# Patient Record
Sex: MALE | Race: WHITE | NOT HISPANIC OR LATINO | Employment: UNEMPLOYED | URBAN - METROPOLITAN AREA
[De-identification: names, ages, dates, MRNs, and addresses within clinical notes are randomized per-mention and may not be internally consistent; named-entity substitution may affect disease eponyms.]

---

## 2017-03-19 ENCOUNTER — HOSPITAL ENCOUNTER (EMERGENCY)
Facility: OTHER | Age: 62
Discharge: HOME | End: 2017-03-19
Attending: EMERGENCY MEDICINE
Payer: COMMERCIAL

## 2017-03-19 VITALS
RESPIRATION RATE: 18 BRPM | TEMPERATURE: 99 F | HEART RATE: 88 BPM | SYSTOLIC BLOOD PRESSURE: 146 MMHG | OXYGEN SATURATION: 95 % | DIASTOLIC BLOOD PRESSURE: 99 MMHG

## 2017-03-19 DIAGNOSIS — T14.90XA INJURY: Primary | ICD-10-CM

## 2017-03-19 DIAGNOSIS — S80.02XA CONTUSION OF KNEE AND LOWER LEG, LEFT, INITIAL ENCOUNTER: ICD-10-CM

## 2017-03-19 DIAGNOSIS — S80.12XA CONTUSION OF KNEE AND LOWER LEG, LEFT, INITIAL ENCOUNTER: ICD-10-CM

## 2017-03-19 DIAGNOSIS — S60.419A ABRASION OF HAND AND FINGERS, RIGHT, INITIAL ENCOUNTER: ICD-10-CM

## 2017-03-19 DIAGNOSIS — S60.511A ABRASION OF HAND AND FINGERS, RIGHT, INITIAL ENCOUNTER: ICD-10-CM

## 2017-03-19 PROCEDURE — 90471 IMMUNIZATION ADMIN: CPT | Performed by: EMERGENCY MEDICINE

## 2017-03-19 PROCEDURE — 90715 TDAP VACCINE 7 YRS/> IM: CPT | Performed by: EMERGENCY MEDICINE

## 2017-03-19 PROCEDURE — 99283 EMERGENCY DEPT VISIT LOW MDM: CPT

## 2017-03-19 PROCEDURE — 63600175 PHARM REV CODE 636 W HCPCS: Performed by: EMERGENCY MEDICINE

## 2017-03-19 RX ORDER — TRAMADOL HYDROCHLORIDE 50 MG/1
50 TABLET ORAL EVERY 6 HOURS PRN
Qty: 12 TABLET | Refills: 0 | Status: SHIPPED | OUTPATIENT
Start: 2017-03-19 | End: 2017-03-29

## 2017-03-19 RX ADMIN — CLOSTRIDIUM TETANI TOXOID ANTIGEN (FORMALDEHYDE INACTIVATED), CORYNEBACTERIUM DIPHTHERIAE TOXOID ANTIGEN (FORMALDEHYDE INACTIVATED), BORDETELLA PERTUSSIS TOXOID ANTIGEN (GLUTARALDEHYDE INACTIVATED), BORDETELLA PERTUSSIS FILAMENTOUS HEMAGGLUTININ ANTIGEN (FORMALDEHYDE INACTIVATED), BORDETELLA PERTUSSIS PERTACTIN ANTIGEN, AND BORDETELLA PERTUSSIS FIMBRIAE 2/3 ANTIGEN 0.5 ML: 5; 2; 2.5; 5; 3; 5 INJECTION, SUSPENSION INTRAMUSCULAR at 09:03

## 2017-03-19 NOTE — DISCHARGE INSTRUCTIONS
Abrasions  Abrasions are skin scrapes. Their treatment depends on how large and deep the abrasion is.  Home care  You may be prescribed an antibiotic cream or ointment to apply to the wound. This helps prevent infection. Follow instructions when using this medication.  General care  · To care for the abrasion, do the following each day for as long as directed by your health care provider.  ¨ If you were given a bandage, change it once a day. If your bandage sticks to the wound, soak it in warm water until it loosens.  ¨ Wash the area with soap and warm water. You may do this in a sink or under a tub faucet or shower. Rinse off the soap. Then pat the area dry with a clean towel.  ¨ If antibiotic ointment or cream was prescribed, reapply it to the wound as directed. Cover the wound with a fresh non-stick bandage. If the bandage becomes wet or dirty, change it as soon as possible.  · You may use acetaminophen or ibuprofen to control pain unless another pain medication was prescribed. Note: If you have chronic liver or kidney disease or ever had a stomach ulcer or GI bleeding, talk with your health care provider before using these medications. Do not use ibuprofen in children under six months of age.  · Most skin wounds heal within ten days. But an infection may occur despite treatment. Therefore, monitor the wound for signs of infection as listed below.  Follow-up care  Follow up with your health care provider, or as advised.  When to seek medical advice  Call your health care provider right away if any of these occur:  · Fever of 101ºF (38.3ºC) or higher, or as directed by your health care provider  · Increasing pain, redness, swelling, or drainage from the wound  · Bleeding from the wound that does not stop after a few minutes of steady, firm pressure  · Decreased ability to move any body part near wound  Date Last Reviewed: 3/22/2015  © 1297-3670 [employer] [facility]. [address] [place] [address], [place]  [address]. All rights reserved. This information is not intended as a substitute for professional medical care. Always follow your healthcare professional's instructions.          Understanding Bone Bruise (Bone Contusion)  A bone bruise is an injury to a bone that is less severe than a bone fracture. Bone bruises are fairly common. They can happen to people of all ages. Any type of bone in your body can get a bone bruise. Other injuries often happen along with a bone bruise, such as damage to nearby ligaments.  What happens when a bone is bruised?  Bone is made of different kinds of tissue. The periosteum is a thin layer of tissue that covers most of a bone. Where bones come together, there is usually a layer of cartilage at the edges. The bone here is called subchondral bone. Deep inside the bone is an area called the medulla. It contains the bone marrow and fibrous tissue called trabeculae.  With a bone fracture, all of the trabeculae in a region of bone have broken. But with a bone bruise, an injury only damages some of these trabeculae. An injury might cause blood to build up in the area beneath the periosteum. This causes a subperiosteal hematoma, a type of bone bruise. An injury might also cause bleeding and swelling in the area between your cartilage and the bone beneath it. This causes a subchondral bone bruise. Or bleeding and swelling can occur in the medulla of your bone. This is called an interosseous bone bruise.  What causes a bone bruise?  Injury of any kind can cause a bone bruise. Sports injuries, motor vehicle accidents, or falls from a height can cause them. Twisting injuries that cause joint sprains can also cause a bone bruise. Health conditions like arthritis may also lead to a bone bruise. This is because arthritis causes bone surfaces to grind against each other. Child abuse is another cause of bone bruises.  Symptoms of a bone bruise  Symptoms of a bone bruise can include:  · Pain and soreness  in the injured area  · Swelling in the area and soft tissues around it  · Change in color of the injured area  · Swelling or stiffness of an injured joint  This pain is often more severe and lasts longer than a soft tissue injury. How severe your symptoms are and how long they last depends on how severe the bone bruise is.  Diagnosing a bone bruise  Your healthcare provider will ask you about your medical history and symptoms. He or she will ask how you got your injury. Your provider will examine the injured area to check for pain, bruising, and swelling. After the exam, your health care provider may be able to tell if you have a bone bruise.  A bone bruise doesnt show up on an X-ray. But you may be given an X-ray to rule out a bone fracture. A fracture may need a different kind of treatment. An MRI can confirm a bone bruise. But your healthcare provider will likely only give you an MRI if your symptoms dont get better.  Date Last Reviewed: 3/3/2015  © 3677-7925 [employer]. [address] [place] [address], [place] [address]. All rights reserved. This information is not intended as a substitute for professional medical care. Always follow your healthcare professional's instructions.
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
## 2017-03-19 NOTE — ED AVS SNAPSHOT
[facility] EMERGENCY DEPARTMENT  [address] [place] [id number]               Adrien Mckeon   3/19/2017  9:08 AM   ED    Description:  Male : 1955   Department:  [facility] Emergency Department           Your Care was Coordinated By:     Provider Role From To    Krystyna Zamora MD Attending Provider 17 0913 --      Reason for Visit     Knee Injury           Diagnoses this Visit        Comments    Injury    -  Primary     Contusion of knee and lower leg, left, initial encounter         Abrasion of hand and fingers, right, initial encounter           ED Disposition     ED Disposition Condition Comment    Discharge             To Do List           Follow-up Information     Follow up with Primary Doctor No.    Why:  As needed, [facility] Referral Line (Tel: [phone])       These Medications        Disp Refills Start End    tramadol (ULTRAM) 50 mg tablet 12 tablet 0 3/19/2017 3/29/2017    Take 1 tablet (50 mg total) by mouth every 6 (six) hours as needed for Pain. - Oral    Pharmacy: [facility] - [facility] - [place] - [address] Ph #: [phone]         [facility]     [facility] -  Assistance  Registered nurses in the [facility] provide clinical advisement, health education, appointment booking, and other advisory services.  Call for this free service at [phone].             Medications           Message regarding Medications     Verify the changes and/or additions to your medication regime listed below are the same as discussed with your clinician today.  If any of these changes or additions are incorrect, please notify your healthcare provider.        START taking these NEW medications        Refills    tramadol (ULTRAM) 50 mg tablet 0    Sig: Take 1 tablet (50 mg total) by mouth every 6 (six) hours as needed for Pain.    Class: Print    Route: Oral      These medications were administered today         Dose Freq    Tdap vaccine injection 0.5 mL 0.5 mL vaccine x 1 dose    Sig: Inject 0.5 mLs into the muscle vaccine x 1 dose for Meets Vaccination Criteria.    Class: Normal    Route: Intramuscular           Verify that the below list of medications is an accurate representation of the medications you are currently taking.  If none reported, the list may be blank. If incorrect, please contact your healthcare provider. Carry this list with you in case of emergency.           Current Medications     tramadol (ULTRAM) 50 mg tablet Take 1 tablet (50 mg total) by mouth every 6 (six) hours as needed for Pain.           Clinical Reference Information           Your Vitals Were     BP Pulse Temp Resp SpO2       146/99 88 98.8 °F (37.1 °C) (Temporal) 18 95%       Allergies as of 3/19/2017     No Known Allergies      Immunizations Administered on Date of Encounter - 3/19/2017     Name Date Dose VIS Date Route    TDAP 3/19/2017 0.5 mL 2/24/2015 Intramuscular      ED Micro, Lab, POCT     None      ED Imaging Orders     Start Ordered       Status Ordering Provider    03/19/17 0920 03/19/17 0919  X-Ray Knee 3 View Left  1 time imaging      Final result         Discharge Instructions         Abrasions  Abrasions are skin scrapes. Their treatment depends on how large and deep the abrasion is.  Home care  You may be prescribed an antibiotic cream or ointment to apply to the wound. This helps prevent infection. Follow instructions when using this medication.  General care  · To care for the abrasion, do the following each day for as long as directed by your health care provider.  ¨ If you were given a bandage, change it once a day. If your bandage sticks to the wound, soak it in warm water until it loosens.  ¨ Wash the area with soap and warm water. You may do this in a sink or under a tub faucet or shower. Rinse off the soap. Then pat the area dry with a clean towel.  ¨ If antibiotic ointment or cream was prescribed, reapply it to the  wound as directed. Cover the wound with a fresh non-stick bandage. If the bandage becomes wet or dirty, change it as soon as possible.  · You may use acetaminophen or ibuprofen to control pain unless another pain medication was prescribed. Note: If you have chronic liver or kidney disease or ever had a stomach ulcer or GI bleeding, talk with your health care provider before using these medications. Do not use ibuprofen in children under six months of age.  · Most skin wounds heal within ten days. But an infection may occur despite treatment. Therefore, monitor the wound for signs of infection as listed below.  Follow-up care  Follow up with your health care provider, or as advised.  When to seek medical advice  Call your health care provider right away if any of these occur:  · Fever of 101ºF (38.3ºC) or higher, or as directed by your health care provider  · Increasing pain, redness, swelling, or drainage from the wound  · Bleeding from the wound that does not stop after a few minutes of steady, firm pressure  · Decreased ability to move any body part near wound  Date Last Reviewed: 3/22/2015  © 1869-3842 [employer]. [address] [place] [address]. All rights reserved. This information is not intended as a substitute for professional medical care. Always follow your healthcare professional's instructions.          Understanding Bone Bruise (Bone Contusion)  A bone bruise is an injury to a bone that is less severe than a bone fracture. Bone bruises are fairly common. They can happen to people of all ages. Any type of bone in your body can get a bone bruise. Other injuries often happen along with a bone bruise, such as damage to nearby ligaments.  What happens when a bone is bruised?  Bone is made of different kinds of tissue. The periosteum is a thin layer of tissue that covers most of a bone. Where bones come together, there is usually a layer of cartilage at the edges. The bone here is  called subchondral bone. Deep inside the bone is an area called the medulla. It contains the bone marrow and fibrous tissue called trabeculae.  With a bone fracture, all of the trabeculae in a region of bone have broken. But with a bone bruise, an injury only damages some of these trabeculae. An injury might cause blood to build up in the area beneath the periosteum. This causes a subperiosteal hematoma, a type of bone bruise. An injury might also cause bleeding and swelling in the area between your cartilage and the bone beneath it. This causes a subchondral bone bruise. Or bleeding and swelling can occur in the medulla of your bone. This is called an interosseous bone bruise.  What causes a bone bruise?  Injury of any kind can cause a bone bruise. Sports injuries, motor vehicle accidents, or falls from a height can cause them. Twisting injuries that cause joint sprains can also cause a bone bruise. Health conditions like arthritis may also lead to a bone bruise. This is because arthritis causes bone surfaces to grind against each other. Child abuse is another cause of bone bruises.  Symptoms of a bone bruise  Symptoms of a bone bruise can include:  · Pain and soreness in the injured area  · Swelling in the area and soft tissues around it  · Change in color of the injured area  · Swelling or stiffness of an injured joint  This pain is often more severe and lasts longer than a soft tissue injury. How severe your symptoms are and how long they last depends on how severe the bone bruise is.  Diagnosing a bone bruise  Your healthcare provider will ask you about your medical history and symptoms. He or she will ask how you got your injury. Your provider will examine the injured area to check for pain, bruising, and swelling. After the exam, your health care provider may be able to tell if you have a bone bruise.  A bone bruise doesnt show up on an X-ray. But you may be given an X-ray to rule out a bone fracture. A  fracture may need a different kind of treatment. An MRI can confirm a bone bruise. But your healthcare provider will likely only give you an MRI if your symptoms dont get better.  Date Last Reviewed: 3/3/2015  © 3114-6977 [employer]. [address] [place] [address], [place] [address]. All rights reserved. This information is not intended as a substitute for professional medical care. Always follow your healthcare professional's instructions.          [facility] Sign-Up     Activating your [facility] account is as easy as 1-2-3!     1) Visit [email or web address], select Sign Up Now, enter this activation code and your date of birth, then select Next.  [email or web address]  Expires: 5/3/2017  9:31 AM      2) Create a username and password to use when you visit [facility] in the future and select a security question in case you lose your password and select Next.    3) Enter your e-mail address and click Sign Up!    Additional Information  If you have questions, please e-mail [email or web address] or call [phone] to talk to our [facility] staff. Remember, [facility] is NOT to be used for urgent needs. For medical emergencies, dial 911.         Smoking Cessation     If you would like to quit smoking:   You may be eligible for free services if you are a [place] resident and started smoking cigarettes before September 1, 1988.  Call the Smoking Cessation Trust (SCT) toll free at (376) 550-9335 or (060) 562-9929.   Call 2-065-QUIT-NOW if you do not meet the above criteria.             [facility] Emergency Department complies with applicable Federal civil rights laws and does not discriminate on the basis of race, color, national origin, age, disability, or sex.        Language Assistance Services     ATTENTION: Language assistance services are available, free of charge. Please call [phone].      ATENCIÓN: Si habla español, tiene a rubio disposición servicios gratuitos de asistencia lingüística. Llame al  [phone].     TAL Ý: N?u b?n nói Ti?ng Vi?t, có các d?ch v? h? tr? ngôn ng? mi?n phí dành cho b?n. G?i s? [phone].

## 2018-07-09 ENCOUNTER — HOSPITAL ENCOUNTER (EMERGENCY)
Facility: HOSPITAL | Age: 63
Discharge: HOME | End: 2018-07-09
Attending: EMERGENCY MEDICINE
Payer: COMMERCIAL

## 2018-07-09 VITALS
HEIGHT: 67 IN | BODY MASS INDEX: 30.61 KG/M2 | DIASTOLIC BLOOD PRESSURE: 98 MMHG | SYSTOLIC BLOOD PRESSURE: 167 MMHG | RESPIRATION RATE: 22 BRPM | TEMPERATURE: 99 F | WEIGHT: 195 LBS | OXYGEN SATURATION: 97 % | HEART RATE: 69 BPM

## 2018-07-09 DIAGNOSIS — S22.32XA CLOSED FRACTURE OF ONE RIB OF LEFT SIDE, INITIAL ENCOUNTER: ICD-10-CM

## 2018-07-09 DIAGNOSIS — R05.9 COUGH: ICD-10-CM

## 2018-07-09 DIAGNOSIS — J43.9 PULMONARY EMPHYSEMA, UNSPECIFIED EMPHYSEMA TYPE: ICD-10-CM

## 2018-07-09 DIAGNOSIS — R07.89 LEFT-SIDED CHEST WALL PAIN: Primary | ICD-10-CM

## 2018-07-09 LAB
ALBUMIN SERPL BCP-MCNC: 3.6 G/DL
ALP SERPL-CCNC: 109 U/L
ALT SERPL W/O P-5'-P-CCNC: 85 U/L
ANION GAP SERPL CALC-SCNC: 9 MMOL/L
AST SERPL-CCNC: 50 U/L
BASOPHILS # BLD AUTO: 0.01 K/UL
BASOPHILS NFR BLD: 0.2 %
BILIRUB SERPL-MCNC: 0.3 MG/DL
BILIRUB UR QL STRIP: NEGATIVE
BUN SERPL-MCNC: 10 MG/DL
CALCIUM SERPL-MCNC: 9.1 MG/DL
CHLORIDE SERPL-SCNC: 104 MMOL/L
CLARITY UR: CLEAR
CO2 SERPL-SCNC: 25 MMOL/L
COLOR UR: YELLOW
CREAT SERPL-MCNC: 0.8 MG/DL
DIFFERENTIAL METHOD: ABNORMAL
EOSINOPHIL # BLD AUTO: 0.2 K/UL
EOSINOPHIL NFR BLD: 2.5 %
ERYTHROCYTE [DISTWIDTH] IN BLOOD BY AUTOMATED COUNT: 12 %
EST. GFR  (AFRICAN AMERICAN): >60 ML/MIN/1.73 M^2
EST. GFR  (NON AFRICAN AMERICAN): >60 ML/MIN/1.73 M^2
GLUCOSE SERPL-MCNC: 128 MG/DL
GLUCOSE UR QL STRIP: NEGATIVE
HCT VFR BLD AUTO: 42.2 %
HGB BLD-MCNC: 14.7 G/DL
HGB UR QL STRIP: NEGATIVE
KETONES UR QL STRIP: NEGATIVE
LEUKOCYTE ESTERASE UR QL STRIP: NEGATIVE
LIPASE SERPL-CCNC: 94 U/L
LYMPHOCYTES # BLD AUTO: 2.4 K/UL
LYMPHOCYTES NFR BLD: 36.9 %
MCH RBC QN AUTO: 31.5 PG
MCHC RBC AUTO-ENTMCNC: 34.8 G/DL
MCV RBC AUTO: 91 FL
MONOCYTES # BLD AUTO: 0.8 K/UL
MONOCYTES NFR BLD: 12.8 %
NEUTROPHILS # BLD AUTO: 3.1 K/UL
NEUTROPHILS NFR BLD: 47.4 %
NITRITE UR QL STRIP: NEGATIVE
PH UR STRIP: 5 [PH] (ref 5–8)
PLATELET # BLD AUTO: 211 K/UL
PMV BLD AUTO: 11.6 FL
POTASSIUM SERPL-SCNC: 4.1 MMOL/L
PROT SERPL-MCNC: 7.1 G/DL
PROT UR QL STRIP: NEGATIVE
RBC # BLD AUTO: 4.66 M/UL
SODIUM SERPL-SCNC: 138 MMOL/L
SP GR UR STRIP: 1.01 (ref 1–1.03)
URN SPEC COLLECT METH UR: NORMAL
UROBILINOGEN UR STRIP-ACNC: NEGATIVE EU/DL
WBC # BLD AUTO: 6.48 K/UL

## 2018-07-09 PROCEDURE — 63600175 PHARM REV CODE 636 W HCPCS: Performed by: EMERGENCY MEDICINE

## 2018-07-09 PROCEDURE — 80053 COMPREHEN METABOLIC PANEL: CPT

## 2018-07-09 PROCEDURE — 85025 COMPLETE CBC W/AUTO DIFF WBC: CPT

## 2018-07-09 PROCEDURE — 81003 URINALYSIS AUTO W/O SCOPE: CPT

## 2018-07-09 PROCEDURE — 25000242 PHARM REV CODE 250 ALT 637 W/ HCPCS: Performed by: EMERGENCY MEDICINE

## 2018-07-09 PROCEDURE — 94761 N-INVAS EAR/PLS OXIMETRY MLT: CPT

## 2018-07-09 PROCEDURE — 25000003 PHARM REV CODE 250: Performed by: EMERGENCY MEDICINE

## 2018-07-09 PROCEDURE — 83690 ASSAY OF LIPASE: CPT

## 2018-07-09 PROCEDURE — 94640 AIRWAY INHALATION TREATMENT: CPT

## 2018-07-09 PROCEDURE — 96372 THER/PROPH/DIAG INJ SC/IM: CPT

## 2018-07-09 PROCEDURE — 99285 EMERGENCY DEPT VISIT HI MDM: CPT | Mod: 25

## 2018-07-09 RX ORDER — IPRATROPIUM BROMIDE AND ALBUTEROL SULFATE 2.5; .5 MG/3ML; MG/3ML
3 SOLUTION RESPIRATORY (INHALATION)
Status: COMPLETED | OUTPATIENT
Start: 2018-07-09 | End: 2018-07-09

## 2018-07-09 RX ORDER — ALBUTEROL SULFATE 2.5 MG/.5ML
5 SOLUTION RESPIRATORY (INHALATION)
Status: COMPLETED | OUTPATIENT
Start: 2018-07-09 | End: 2018-07-09

## 2018-07-09 RX ORDER — BETAMETHASONE SODIUM PHOSPHATE AND BETAMETHASONE ACETATE 3; 3 MG/ML; MG/ML
12 INJECTION, SUSPENSION INTRA-ARTICULAR; INTRALESIONAL; INTRAMUSCULAR; SOFT TISSUE ONCE
Status: COMPLETED | OUTPATIENT
Start: 2018-07-09 | End: 2018-07-09

## 2018-07-09 RX ORDER — PREDNISONE 20 MG/1
60 TABLET ORAL DAILY
Qty: 15 TABLET | Refills: 0 | Status: SHIPPED | OUTPATIENT
Start: 2018-07-09 | End: 2018-07-14

## 2018-07-09 RX ORDER — ASPIRIN 81 MG/1
81 TABLET ORAL DAILY
COMMUNITY
End: 2023-12-08

## 2018-07-09 RX ORDER — ALFUZOSIN HYDROCHLORIDE 10 MG/1
10 TABLET, EXTENDED RELEASE ORAL
Status: ON HOLD | COMMUNITY
End: 2022-12-01 | Stop reason: HOSPADM

## 2018-07-09 RX ORDER — ESCITALOPRAM OXALATE 10 MG/1
10 TABLET ORAL DAILY
COMMUNITY
End: 2019-09-24

## 2018-07-09 RX ORDER — KETOROLAC TROMETHAMINE 30 MG/ML
10 INJECTION, SOLUTION INTRAMUSCULAR; INTRAVENOUS
Status: DISCONTINUED | OUTPATIENT
Start: 2018-07-09 | End: 2018-07-09

## 2018-07-09 RX ORDER — HYDROCODONE BITARTRATE AND ACETAMINOPHEN 5; 325 MG/1; MG/1
1 TABLET ORAL EVERY 4 HOURS PRN
Qty: 15 TABLET | Refills: 0 | Status: SHIPPED | OUTPATIENT
Start: 2018-07-09 | End: 2018-07-19

## 2018-07-09 RX ORDER — ALBUTEROL SULFATE 90 UG/1
1-2 AEROSOL, METERED RESPIRATORY (INHALATION) EVERY 6 HOURS PRN
Qty: 1 INHALER | Refills: 1 | Status: SHIPPED | OUTPATIENT
Start: 2018-07-09 | End: 2018-08-08

## 2018-07-09 RX ORDER — LEVOTHYROXINE SODIUM 50 UG/1
50 TABLET ORAL DAILY
Status: ON HOLD | COMMUNITY
End: 2022-12-01 | Stop reason: HOSPADM

## 2018-07-09 RX ORDER — CLONAZEPAM 0.5 MG/1
0.5 TABLET ORAL 2 TIMES DAILY PRN
COMMUNITY
End: 2019-09-24

## 2018-07-09 RX ADMIN — ALBUTEROL SULFATE 5 MG: 2.5 SOLUTION RESPIRATORY (INHALATION) at 06:07

## 2018-07-09 RX ADMIN — BETAMETHASONE SODIUM PHOSPHATE AND BETAMETHASONE ACETATE 12 MG: 3; 3 INJECTION, SUSPENSION INTRA-ARTICULAR; INTRALESIONAL; INTRAMUSCULAR at 06:07

## 2018-07-09 RX ADMIN — GUAIFENESIN AND DEXTROMETHORPHAN HYDROBROMIDE 1 TABLET: 600; 30 TABLET, EXTENDED RELEASE ORAL at 05:07

## 2018-07-09 RX ADMIN — IPRATROPIUM BROMIDE AND ALBUTEROL SULFATE 3 ML: .5; 2.5 SOLUTION RESPIRATORY (INHALATION) at 06:07

## 2018-07-09 NOTE — DISCHARGE INSTRUCTIONS
Please return immediately if you get worse or if new problems develop.  Please make an appointment to see her primary care doctor this week.  Hydrocodone for pain.  Prednisone as directed.  Rest

## 2018-07-09 NOTE — ED TRIAGE NOTES
"Pt presents to the ED vis EMS with complaints of LUQ pain and SOB. Pt states "he was coughing hard he just started hurting really bad".  "

## 2018-07-09 NOTE — ED PROVIDER NOTES
"    Encounter Date: 7/9/2018    SCRIBE #1 NOTE: I, MaryXanderArpita Jason, am scribing for, and in the presence of,  Keith Terrell MD. I have scribed the following portions of the note - Other sections scribed: HPI, ROS.       History     Chief Complaint   Patient presents with    Muscle Pain     left upper abdominal/flank pain, states he has had cough for last few days, approx 30 mins ago states he feels like he pulled a muscle. Hurts worse with coughing or movement.    Shortness of Breath     "I do feel like I am short winded." Pt using abdominal muscles with breathing.     CC: Cough    63 y/o male with emphysema presents to the ED via EMS for emergent evaluation of chronic cough with associated pain to L lateral thorax that started at 4AM today. The patient reports a "sharp" pain that is severe (8/10) and worse with deep inhalation and coughing. The patient reports chronic cough and stable chronic SOB due to hx of emphysema. He states his current cough is worse than his usual chronic cough.  He reports that he smoked mojo yesterday. The patient smokes cigarettes and drinks EtOH occasionally. The patient denies abdominal pain, dysuria, N/V/D, rash, or arm/leg trouble. No other symptoms reported.      The history is provided by the patient. No  was used.     Review of patient's allergies indicates:  No Known Allergies  Past Medical History:   Diagnosis Date    Arthritis     Prostate disorder     Thyroid disease      Past Surgical History:   Procedure Laterality Date    HAND SURGERY      left hand     Family History   Problem Relation Age of Onset    Cancer Mother     Cancer Father     Heart disease Father     Cancer Sister      Social History   Substance Use Topics    Smoking status: Current Every Day Smoker     Packs/day: 1.00     Types: Cigarettes    Smokeless tobacco: Never Used    Alcohol use 5.0 oz/week     10 Standard drinks or equivalent per week     Review of Systems "   Constitutional: Negative for chills and fever.   HENT: Negative for congestion, ear pain, rhinorrhea and sore throat.    Eyes: Negative for redness.   Respiratory: Positive for cough (chronic with associated pain to L lateral thorax) and shortness of breath (stable chronic).    Cardiovascular: Negative for chest pain.   Gastrointestinal: Negative for abdominal pain, diarrhea, nausea and vomiting.   Genitourinary: Negative for decreased urine volume, difficulty urinating, dysuria, frequency, hematuria and urgency.   Musculoskeletal: Negative for back pain and neck pain.        (-) arm/leg trouble   Skin: Negative for rash.   Neurological: Negative for headaches.       Physical Exam     Initial Vitals [07/09/18 0458]   BP Pulse Resp Temp SpO2   (!) 160/94 92 18 98.2 °F (36.8 °C) 98 %      MAP       --         Physical Exam  The patient was examined specifically for the following:   General:No significant distress, Good color, Warm and dry. Head and neck:Scalp atraumatic, Neck supple. Neurological:Appropriate conversation, Gross motor deficits. Eyes:Conjugate gaze, Clear corneas. ENT: No epistaxis. Cardiac: Regular rate and rhythm, Grossly normal heart tones. Pulmonary: Wheezing, Rales. Gastrointestinal: Abdominal tenderness, Abdominal distention. Musculoskeletal: Extremity deformity, Apparent pain with range of motion of the joints. Skin: Rash.   The findings on examination were normal except for the following:  The patient has wheezing bilaterally.  There are sharp tenderness in the left lateral thorax, at about the 5th rib.  Patient has a hoarse voice.  The posterior pharynx is essentially unremarkable.  There is no stridor.  There is no evidence of airway obstruction.  ED Course   Procedures  Labs Reviewed   CBC W/ AUTO DIFFERENTIAL - Abnormal; Notable for the following:        Result Value    MCH 31.5 (*)     All other components within normal limits   COMPREHENSIVE METABOLIC PANEL - Abnormal; Notable for the  following:     Glucose 128 (*)     AST 50 (*)     ALT 85 (*)     All other components within normal limits   LIPASE - Abnormal; Notable for the following:     Lipase 94 (*)     All other components within normal limits   URINALYSIS          Imaging Results          X-Ray Chest PA And Lateral (Final result)  Result time 07/09/18 05:31:09    Final result by Chace Jim MD (07/09/18 05:31:09)                 Impression:      1.  Mild coarse interstitial lung markings bilaterally without evidence of large confluent airspace consolidation.    2.  Possible minimally displaced fracture of the lateral left 6th rib.  Correlation with point tenderness is advised.      Electronically signed by: Chace Jim MD  Date:    07/09/2018  Time:    05:31             Narrative:    EXAMINATION:  XR CHEST PA AND LATERAL    CLINICAL HISTORY:  Cough    TECHNIQUE:  PA and lateral views of the chest were performed.    COMPARISON:  07/04/2015    FINDINGS:  The cardiomediastinal silhouette appears within normal limits.  There is atherosclerotic calcification of the thoracic aorta.  The lungs appear symmetrically expanded with mild coarse interstitial markings bilaterally.  There is subsegmental atelectasis at the lung bases.  No large confluent airspace consolidation identified.  No definite evidence of significant pneumothorax.  There is no pleural effusion.    There is an apparent minimally displaced fracture of the left 6th lateral rib.  No definite additional displaced rib fractures appreciated.  The remaining osseous structures demonstrate stable degenerative changes.                              Medical decision making:  Given the above, this patient was smoking Mojo.  He coughed and had developed sharp pain in his left lateral thorax.  He has point tenderness there.  There is no evidence of pneumothorax pneumonia pleural effusion.  The patient does have interstitial changes consistent with chronic lung disease.  The patient is  a cigarette smoker.  He has bilateral wheezing.  He has some shortness of breath but it is chronic and stable. The left lateral thoracic pain is well localized, sharp and worse with deep breathing.  X-rays reveal possible fractured rib is conceivable this patient does have a fracture there.  I doubt pulmonary embolus.  I do not think this is a presentation for cardiac pain. The patient has a hoarse voice.  The posterior pharynx is slightly red but otherwise unremarkable.  I doubt airway obstruction foreign body.  The patient probably irritated his throat smoking Mojo.  I will treat him with steroids and bronchodilators and have him follow up with primary care.                Scribe Attestation:   Scribe #1: I performed the above scribed service and the documentation accurately describes the services I performed. I attest to the accuracy of the note.    Attending Attestation:           Physician Attestation for Scribe:  Physician Attestation Statement for Scribe #1: I, Keith Terrell MD, reviewed documentation, as scribed by Adrian Jason in my presence, and it is both accurate and complete.                    Clinical Impression:   The primary encounter diagnosis was Left-sided chest wall pain. Diagnoses of Cough, Closed fracture of one rib of left side, initial encounter, and Pulmonary emphysema, unspecified emphysema type were also pertinent to this visit.                             Keith Chamberlain MD  07/09/18 9098

## 2018-10-28 ENCOUNTER — HOSPITAL ENCOUNTER (EMERGENCY)
Facility: HOSPITAL | Age: 63
Discharge: HOME | End: 2018-10-28
Attending: EMERGENCY MEDICINE
Payer: COMMERCIAL

## 2018-10-28 VITALS
TEMPERATURE: 98 F | OXYGEN SATURATION: 98 % | RESPIRATION RATE: 18 BRPM | WEIGHT: 192 LBS | SYSTOLIC BLOOD PRESSURE: 175 MMHG | BODY MASS INDEX: 30.13 KG/M2 | HEART RATE: 83 BPM | HEIGHT: 67 IN | DIASTOLIC BLOOD PRESSURE: 91 MMHG
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
## 2018-10-28 DIAGNOSIS — J90 PLEURAL EFFUSION: ICD-10-CM

## 2018-10-28 DIAGNOSIS — S22.32XA CLOSED FRACTURE OF ONE RIB OF LEFT SIDE, INITIAL ENCOUNTER: Primary | ICD-10-CM

## 2018-10-28 DIAGNOSIS — R07.81 RIB PAIN ON LEFT SIDE: ICD-10-CM

## 2018-10-28 PROCEDURE — 99284 EMERGENCY DEPT VISIT MOD MDM: CPT | Mod: 25

## 2018-10-28 PROCEDURE — 99900035 HC TECH TIME PER 15 MIN (STAT)

## 2018-10-28 PROCEDURE — 63600175 PHARM REV CODE 636 W HCPCS: Performed by: PHYSICIAN ASSISTANT

## 2018-10-28 PROCEDURE — 96374 THER/PROPH/DIAG INJ IV PUSH: CPT

## 2018-10-28 PROCEDURE — 94799 UNLISTED PULMONARY SVC/PX: CPT

## 2018-10-28 PROCEDURE — 96375 TX/PRO/DX INJ NEW DRUG ADDON: CPT

## 2018-10-28 RX ORDER — OXYCODONE AND ACETAMINOPHEN 5; 325 MG/1; MG/1
1 TABLET ORAL EVERY 4 HOURS PRN
Qty: 10 TABLET | Refills: 0 | OUTPATIENT
Start: 2018-10-28 | End: 2018-11-29

## 2018-10-28 RX ORDER — ACETAMINOPHEN 10 MG/ML
1000 INJECTION, SOLUTION INTRAVENOUS ONCE
Status: COMPLETED | OUTPATIENT
Start: 2018-10-28 | End: 2018-10-28

## 2018-10-28 RX ORDER — LIDOCAINE 50 MG/G
1 PATCH TOPICAL DAILY
Qty: 15 PATCH | Refills: 0 | Status: ON HOLD | OUTPATIENT
Start: 2018-10-28 | End: 2022-12-01 | Stop reason: HOSPADM

## 2018-10-28 RX ORDER — KETOROLAC TROMETHAMINE 10 MG/1
10 TABLET, FILM COATED ORAL 3 TIMES DAILY PRN
Qty: 15 TABLET | Refills: 0 | Status: SHIPPED | OUTPATIENT
Start: 2018-10-28 | End: 2018-11-02

## 2018-10-28 RX ORDER — MORPHINE SULFATE 4 MG/ML
4 INJECTION, SOLUTION INTRAMUSCULAR; INTRAVENOUS
Status: COMPLETED | OUTPATIENT
Start: 2018-10-28 | End: 2018-10-28

## 2018-10-28 RX ORDER — ALBUTEROL SULFATE 2.5 MG/.5ML
2.5 SOLUTION RESPIRATORY (INHALATION) EVERY 4 HOURS PRN
Qty: 15 EACH | Refills: 0 | Status: SHIPPED | OUTPATIENT
Start: 2018-10-28 | End: 2019-10-28

## 2018-10-28 RX ADMIN — MORPHINE SULFATE 4 MG: 4 INJECTION INTRAVENOUS at 01:10

## 2018-10-28 RX ADMIN — ACETAMINOPHEN 1000 MG: 10 INJECTION, SOLUTION INTRAVENOUS at 12:10

## 2018-10-28 NOTE — DISCHARGE INSTRUCTIONS
Toradol for pain. Percocet for severe pain. Lidoderm patch daily. Incentive spirometry 10x daily. Follow up with primary care physician this week.  Return to this ED if you begin with shortness of breath, if you begin worsening pain, if any other problems occur.
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
## 2018-11-29 ENCOUNTER — HOSPITAL ENCOUNTER (EMERGENCY)
Facility: HOSPITAL | Age: 63
Discharge: HOME | End: 2018-11-29
Attending: EMERGENCY MEDICINE
Payer: COMMERCIAL

## 2018-11-29 VITALS
TEMPERATURE: 98 F | BODY MASS INDEX: 30.86 KG/M2 | HEIGHT: 66 IN | WEIGHT: 192 LBS | RESPIRATION RATE: 20 BRPM | OXYGEN SATURATION: 99 % | HEART RATE: 79 BPM | SYSTOLIC BLOOD PRESSURE: 149 MMHG | DIASTOLIC BLOOD PRESSURE: 92 MMHG

## 2018-11-29 DIAGNOSIS — S22.42XA CLOSED FRACTURE OF MULTIPLE RIBS OF LEFT SIDE, INITIAL ENCOUNTER: Primary | ICD-10-CM

## 2018-11-29 DIAGNOSIS — M54.6 ACUTE LEFT-SIDED THORACIC BACK PAIN: ICD-10-CM

## 2018-11-29 PROCEDURE — 99283 EMERGENCY DEPT VISIT LOW MDM: CPT | Mod: 25

## 2018-11-29 RX ORDER — AMLODIPINE BESYLATE 10 MG/1
10 TABLET ORAL DAILY
COMMUNITY

## 2018-11-29 RX ORDER — TIOTROPIUM BROMIDE 18 UG/1
18 CAPSULE ORAL; RESPIRATORY (INHALATION) DAILY
COMMUNITY
End: 2023-12-08

## 2018-11-29 RX ORDER — HYDROCODONE BITARTRATE AND ACETAMINOPHEN 5; 325 MG/1; MG/1
1 TABLET ORAL EVERY 4 HOURS PRN
Qty: 15 TABLET | Refills: 0 | Status: SHIPPED | OUTPATIENT
Start: 2018-11-29 | End: 2018-12-09

## 2018-11-29 NOTE — DISCHARGE INSTRUCTIONS
Please follow-up with her primary care doctor this week.  Please continue usual medicines, including the prednisone.  Hydrocodone for pain.  Use a heating pad.  Rest.  Lots of deep breathing.  Rest.

## 2018-11-29 NOTE — ED TRIAGE NOTES
"Arrived t ED after having left sided flank pain that started 2 months ago, and coughing and feeling "Pop." Reports having a fall the same night prior to feeling pop. Denies CP,  Reports SOB with deep inhalation  "
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
## 2019-04-20 ENCOUNTER — HOSPITAL ENCOUNTER (EMERGENCY)
Facility: HOSPITAL | Age: 64
Discharge: HOME | End: 2019-04-20
Attending: EMERGENCY MEDICINE
Payer: COMMERCIAL

## 2019-04-20 VITALS
TEMPERATURE: 98 F | HEIGHT: 66 IN | WEIGHT: 192 LBS | OXYGEN SATURATION: 93 % | BODY MASS INDEX: 30.86 KG/M2 | DIASTOLIC BLOOD PRESSURE: 68 MMHG | SYSTOLIC BLOOD PRESSURE: 135 MMHG | HEART RATE: 71 BPM | RESPIRATION RATE: 17 BRPM

## 2019-04-20 DIAGNOSIS — J44.9 CHRONIC OBSTRUCTIVE PULMONARY DISEASE, UNSPECIFIED COPD TYPE: Primary | ICD-10-CM

## 2019-04-20 DIAGNOSIS — R06.02 SOB (SHORTNESS OF BREATH): ICD-10-CM

## 2019-04-20 LAB
ALBUMIN SERPL BCP-MCNC: 3.2 G/DL (ref 3.5–5.2)
ALP SERPL-CCNC: 84 U/L (ref 55–135)
ALT SERPL W/O P-5'-P-CCNC: 13 U/L (ref 10–44)
ANION GAP SERPL CALC-SCNC: 11 MMOL/L (ref 8–16)
AST SERPL-CCNC: 20 U/L (ref 10–40)
BASOPHILS # BLD AUTO: 0 K/UL (ref 0–0.2)
BASOPHILS NFR BLD: 0 % (ref 0–1.9)
BILIRUB SERPL-MCNC: 0.2 MG/DL (ref 0.1–1)
BNP SERPL-MCNC: 39 PG/ML (ref 0–99)
BUN SERPL-MCNC: 5 MG/DL (ref 8–23)
CALCIUM SERPL-MCNC: 8.9 MG/DL (ref 8.7–10.5)
CHLORIDE SERPL-SCNC: 105 MMOL/L (ref 95–110)
CO2 SERPL-SCNC: 26 MMOL/L (ref 23–29)
CREAT SERPL-MCNC: 0.7 MG/DL (ref 0.5–1.4)
DIFFERENTIAL METHOD: ABNORMAL
EOSINOPHIL # BLD AUTO: 0.1 K/UL (ref 0–0.5)
EOSINOPHIL NFR BLD: 2.5 % (ref 0–8)
ERYTHROCYTE [DISTWIDTH] IN BLOOD BY AUTOMATED COUNT: 12.5 % (ref 11.5–14.5)
EST. GFR  (AFRICAN AMERICAN): >60 ML/MIN/1.73 M^2
EST. GFR  (NON AFRICAN AMERICAN): >60 ML/MIN/1.73 M^2
GLUCOSE SERPL-MCNC: 105 MG/DL (ref 70–110)
HCT VFR BLD AUTO: 42.8 % (ref 40–54)
HGB BLD-MCNC: 14.3 G/DL (ref 14–18)
LYMPHOCYTES # BLD AUTO: 2.8 K/UL (ref 1–4.8)
LYMPHOCYTES NFR BLD: 54.1 % (ref 18–48)
MAGNESIUM SERPL-MCNC: 1.9 MG/DL (ref 1.6–2.6)
MCH RBC QN AUTO: 32.1 PG (ref 27–31)
MCHC RBC AUTO-ENTMCNC: 33.4 G/DL (ref 32–36)
MCV RBC AUTO: 96 FL (ref 82–98)
MONOCYTES # BLD AUTO: 0.6 K/UL (ref 0.3–1)
MONOCYTES NFR BLD: 11.7 % (ref 4–15)
NEUTROPHILS # BLD AUTO: 1.6 K/UL (ref 1.8–7.7)
NEUTROPHILS NFR BLD: 31.7 % (ref 38–73)
PLATELET # BLD AUTO: 182 K/UL (ref 150–350)
PMV BLD AUTO: 11 FL (ref 9.2–12.9)
POTASSIUM SERPL-SCNC: 3.6 MMOL/L (ref 3.5–5.1)
PROT SERPL-MCNC: 6.3 G/DL (ref 6–8.4)
RBC # BLD AUTO: 4.46 M/UL (ref 4.6–6.2)
SODIUM SERPL-SCNC: 142 MMOL/L (ref 136–145)
TROPONIN I SERPL DL<=0.01 NG/ML-MCNC: <0.006 NG/ML (ref 0–0.03)
WBC # BLD AUTO: 5.12 K/UL (ref 3.9–12.7)

## 2019-04-20 PROCEDURE — 93005 ELECTROCARDIOGRAM TRACING: CPT

## 2019-04-20 PROCEDURE — 83880 ASSAY OF NATRIURETIC PEPTIDE: CPT

## 2019-04-20 PROCEDURE — 84484 ASSAY OF TROPONIN QUANT: CPT

## 2019-04-20 PROCEDURE — 25000242 PHARM REV CODE 250 ALT 637 W/ HCPCS: Performed by: EMERGENCY MEDICINE

## 2019-04-20 PROCEDURE — 93010 ELECTROCARDIOGRAM REPORT: CPT | Mod: ,,, | Performed by: INTERNAL MEDICINE

## 2019-04-20 PROCEDURE — 99285 EMERGENCY DEPT VISIT HI MDM: CPT

## 2019-04-20 PROCEDURE — 94644 CONT INHLJ TX 1ST HOUR: CPT

## 2019-04-20 PROCEDURE — 85025 COMPLETE CBC W/AUTO DIFF WBC: CPT

## 2019-04-20 PROCEDURE — 83735 ASSAY OF MAGNESIUM: CPT

## 2019-04-20 PROCEDURE — 93010 EKG 12-LEAD: ICD-10-PCS | Mod: ,,, | Performed by: INTERNAL MEDICINE

## 2019-04-20 PROCEDURE — 80053 COMPREHEN METABOLIC PANEL: CPT

## 2019-04-20 RX ORDER — IPRATROPIUM BROMIDE 0.5 MG/2.5ML
1 SOLUTION RESPIRATORY (INHALATION)
Status: COMPLETED | OUTPATIENT
Start: 2019-04-20 | End: 2019-04-20

## 2019-04-20 RX ORDER — ALBUTEROL SULFATE 90 UG/1
1-2 AEROSOL, METERED RESPIRATORY (INHALATION) EVERY 6 HOURS PRN
Qty: 1 INHALER | Refills: 0 | Status: SHIPPED | OUTPATIENT
Start: 2019-04-20 | End: 2023-12-08

## 2019-04-20 RX ORDER — PREDNISONE 20 MG/1
60 TABLET ORAL
Status: DISCONTINUED | OUTPATIENT
Start: 2019-04-20 | End: 2019-04-20

## 2019-04-20 RX ORDER — AZITHROMYCIN 250 MG/1
250 TABLET, FILM COATED ORAL DAILY
Qty: 6 TABLET | Refills: 0 | Status: SHIPPED | OUTPATIENT
Start: 2019-04-20 | End: 2019-09-24

## 2019-04-20 RX ORDER — PREDNISONE 50 MG/1
50 TABLET ORAL DAILY
Qty: 3 TABLET | Refills: 0 | Status: SHIPPED | OUTPATIENT
Start: 2019-04-20 | End: 2019-04-23

## 2019-04-20 RX ORDER — ALBUTEROL SULFATE 2.5 MG/.5ML
10 SOLUTION RESPIRATORY (INHALATION)
Status: COMPLETED | OUTPATIENT
Start: 2019-04-20 | End: 2019-04-20

## 2019-04-20 RX ADMIN — IPRATROPIUM BROMIDE 1 MG: 0.5 SOLUTION RESPIRATORY (INHALATION) at 08:04

## 2019-04-20 RX ADMIN — ALBUTEROL SULFATE 10 MG: 2.5 SOLUTION RESPIRATORY (INHALATION) at 08:04

## 2019-04-20 NOTE — ED PROVIDER NOTES
Encounter Date: 4/20/2019    SCRIBE #1 NOTE: I, Ashley Reese, am scribing for, and in the presence of,  Freddy Wan MD. I have scribed the following portions of the note - Other sections scribed: HPI, ROS, PE .       History     Chief Complaint   Patient presents with    Shortness of Breath     Hx of COPD, RA O2 Sat 94%. non-compliant with home COPD treatments. VSS. rec'd duo neb en route, and 125 mg solu-medrol.      CC: Shortness of Breath    63 year old male  has a past medical history of Arthritis, COPD (chronic obstructive pulmonary disease), Hypertension, Prostate disorder, and Thyroid disease presents to the ED via EMS for evaluation of shortness of breath. Pt reports he wakes up in the morning coughing which then causes him to be short of breath. EMS gave pt Duo Neb and 125 mg Solumedrol en route. Per triage note, pt is not compliant w/ COPD medications. Pt smokes 3 packs of cigarettes a day. He also drinks 6 cans of beer a day. No other symptoms reported.     The history is provided by the patient. No  was used.     Review of patient's allergies indicates:  No Known Allergies  Past Medical History:   Diagnosis Date    Arthritis     COPD (chronic obstructive pulmonary disease)     Hypertension     Prostate disorder     Thyroid disease      Past Surgical History:   Procedure Laterality Date    HAND SURGERY      left hand     Family History   Problem Relation Age of Onset    Cancer Mother     Cancer Father     Heart disease Father     Cancer Sister      Social History     Tobacco Use    Smoking status: Current Every Day Smoker     Packs/day: 3.00     Types: Cigarettes    Smokeless tobacco: Never Used   Substance Use Topics    Alcohol use: Yes     Alcohol/week: 31.2 oz     Types: 10 Standard drinks or equivalent, 42 Cans of beer per week    Drug use: Yes     Frequency: 7.0 times per week     Types: Marijuana     Review of Systems   Constitutional: Negative for chills and  fever.   HENT: Negative for rhinorrhea and sore throat.    Eyes: Negative for redness.   Respiratory: Positive for shortness of breath. Negative for cough.    Cardiovascular: Negative for chest pain.   Gastrointestinal: Negative for abdominal pain, diarrhea, nausea and vomiting.   Genitourinary: Negative for dysuria.   Musculoskeletal: Negative for back pain.   Skin: Negative for color change.   Neurological: Negative for syncope and weakness.   Psychiatric/Behavioral: The patient is not nervous/anxious.        Physical Exam     Initial Vitals [04/20/19 0822]   BP Pulse Resp Temp SpO2   133/77 89 (!) 22 -- 95 %      MAP       --         Physical Exam    Constitutional: He appears well-developed and well-nourished. He is not diaphoretic. He appears distressed.   HENT:   Head: Normocephalic and atraumatic.   Eyes: Conjunctivae and EOM are normal. Pupils are equal, round, and reactive to light. No scleral icterus.   Neck: Normal range of motion. Neck supple.   Cardiovascular: Normal rate, regular rhythm, normal heart sounds and intact distal pulses. Exam reveals no gallop and no friction rub.    No murmur heard.  Pulmonary/Chest: No stridor. Tachypnea noted. No respiratory distress. He has wheezes. He has no rhonchi. He has no rales.   Diminished breath sounds   Abdominal: Soft. Bowel sounds are normal. He exhibits no distension. There is no tenderness. There is no rebound and no guarding.   Musculoskeletal: Normal range of motion. He exhibits no edema or tenderness.   Neurological: He is alert and oriented to person, place, and time. He has normal strength. No cranial nerve deficit.   Skin: Skin is warm and dry. No rash noted.   Psychiatric: He has a normal mood and affect. His behavior is normal.         ED Course   Procedures  Labs Reviewed   CBC W/ AUTO DIFFERENTIAL - Abnormal; Notable for the following components:       Result Value    RBC 4.46 (*)     MCH 32.1 (*)     Gran # (ANC) 1.6 (*)     Gran% 31.7 (*)      Lymph% 54.1 (*)     All other components within normal limits   COMPREHENSIVE METABOLIC PANEL - Abnormal; Notable for the following components:    BUN, Bld 5 (*)     Albumin 3.2 (*)     All other components within normal limits   B-TYPE NATRIURETIC PEPTIDE   MAGNESIUM   TROPONIN I     EKG Readings: (Independently Interpreted)   EKG done at 8:32 a.m. showing normal sinus rhythm rate of 96.  No ST elevation or major T-wave abnormality.  Normal axis.  Compared to previous and similar EKG.  Nonspecific EKG.     ECG Results          EKG 12-lead (In process)  Result time 04/20/19 09:42:46    In process by Interface, Lab In [facility] (04/20/19 09:42:46)                 Narrative:    Test Reason : R06.02,    Vent. Rate : 096 BPM     Atrial Rate : 096 BPM     P-R Int : 148 ms          QRS Dur : 106 ms      QT Int : 380 ms       P-R-T Axes : 067 076 039 degrees     QTc Int : 480 ms    Normal sinus rhythm  Prolonged QT  Abnormal ECG  When compared with ECG of 04-JUL-2015 23:06,  Significant changes have occurred    Referred By: [email or web address]   SELF           Confirmed By:                   In process by Interface, Lab In [facility] (04/20/19 09:38:24)                 Narrative:    Test Reason : R06.02,    Vent. Rate : 096 BPM     Atrial Rate : 096 BPM     P-R Int : 148 ms          QRS Dur : 106 ms      QT Int : 380 ms       P-R-T Axes : 067 076 039 degrees     QTc Int : 480 ms    Normal sinus rhythm  Prolonged QT  Abnormal ECG  When compared with ECG of 04-JUL-2015 23:06,  Significant changes have occurred    Referred By: [email or web address]   SELF           Confirmed By:                             Imaging Results          X-Ray Chest AP Portable (Final result)  Result time 04/20/19 09:17:33    Final result by Ozzy Pruitt MD (04/20/19 09:17:33)                 Impression:      Suggestion of continued small left pleural effusion.      Electronically signed by: Ozzy Pruitt MD  Date:    04/20/2019  Time:    09:17             Narrative:     EXAMINATION:  XR CHEST AP PORTABLE    CLINICAL HISTORY:  sob;    TECHNIQUE:  Single frontal portable view of the chest was performed.    COMPARISON:  Chest radiograph 11/29/2018    FINDINGS:  Support devices: None    Attenuation of the lung bases is primarily artifactual related to patient body habitus and portable technique.  There is question of a continued small left pleural effusion.  No pneumothorax.    The cardiac silhouette is normal in size. The hilar and mediastinal contours are unremarkable.    Left-sided rib fractures are unchanged.                                 Medical Decision Making:   Initial Assessment:   Pt presenting 2/2 wheezing and SOB c/w COPD exacerbation. Patient started on 10mg albuterol, 1 mg ipratropium.  Patient already received 3 breathing treatments before arrival and IV steroids. Will monitor for worsening respiratory distress to considered bipap vs intubation in patient. Will reassess after treatments    Also considered but less likely:  Acs: ekg doesn't show stemi. Troponin ordered  Pna: symptoms bilaterally and no fevers.   Bronchitis: considered but hpi most c/w copd  CHF: considered. Will compare bnp to previous and cxr for fluid overload. Possible  Pneumothorax: bilateral breath sounds    10:13 AM  Patient feeling better.  O2 sat is 95% on room air.  Will get ambulatory O2 sat.  Labs are reassuring but pending troponin.  Delay in getting troponin back secondary to lab issues.  Improved aeration.    Ambulatory sats never dropped below 94%.  Will send patient home on a Z-Terrell and steroid burst.  Return precautions given.  Patient to follow up primary care. I discussed with the patient the diagnosis, treatment plan, indications for return to the emergency department, and for expected follow-up. The patient verbalized an understanding. The patient is asked if there are any questions or concerns. We discuss the case, until all issues are addressed to the patients satisfaction.  Patient understands and is agreeable to the plan.   Freddy Wan      Please put in 35 minutes of critical care due to patient having a high risk of respiratory failure.   Separate from teaching and exclusive of procedure and ekg time  Includes:  Time at bedside  Time reviewing test results  Time discussing case with staff  Time documenting the medical record  Time spent with family members  Time spent with consults  Management    Clinical Tests:   Lab Tests: Ordered and Reviewed  Radiological Study: Reviewed and Ordered  Medical Tests: Ordered and Reviewed            Scribe Attestation:   Scribe #1: I performed the above scribed service and the documentation accurately describes the services I performed. I attest to the accuracy of the note.    Attending Attestation:           Physician Attestation for Scribe:  Physician Attestation Statement for Scribe #1: I, Freddy Wan MD, reviewed documentation, as scribed by Ashley Reese in my presence, and it is both accurate and complete.                    Clinical Impression:       ICD-10-CM ICD-9-CM   1. Chronic obstructive pulmonary disease, unspecified COPD type J44.9 496   2. SOB (shortness of breath) R06.02 786.05                                Freddy Wan MD  04/20/19 1033

## 2019-04-20 NOTE — ED TRIAGE NOTES
Pt arrives to er via ems on stretcher from home pt aaox3, no distress. Pt states hx of copd, currently smokes. Pt states sob all the time, today was worse than usual and he called ems. Denies chest pain, numbness or tingling,

## 2019-04-20 NOTE — ED NOTES
Ambulatory sats obtained while walking with pt 94-98% on room air, no distress noted. Dr butcher notified

## 2019-09-24 ENCOUNTER — HOSPITAL ENCOUNTER (EMERGENCY)
Facility: HOSPITAL | Age: 64
Discharge: HOME | End: 2019-09-24
Attending: EMERGENCY MEDICINE
Payer: COMMERCIAL

## 2019-09-24 VITALS
HEART RATE: 83 BPM | RESPIRATION RATE: 19 BRPM | OXYGEN SATURATION: 99 % | HEIGHT: 66 IN | SYSTOLIC BLOOD PRESSURE: 130 MMHG | DIASTOLIC BLOOD PRESSURE: 76 MMHG | BODY MASS INDEX: 30.86 KG/M2 | WEIGHT: 192 LBS | TEMPERATURE: 98 F

## 2019-09-24 DIAGNOSIS — J44.1 COPD EXACERBATION: Primary | ICD-10-CM

## 2019-09-24 DIAGNOSIS — R07.81 RIB PAIN ON LEFT SIDE: ICD-10-CM

## 2019-09-24 LAB
ANION GAP SERPL CALC-SCNC: 8 MMOL/L (ref 8–16)
BASOPHILS # BLD AUTO: 0.01 K/UL (ref 0–0.2)
BASOPHILS NFR BLD: 0.1 % (ref 0–1.9)
BUN SERPL-MCNC: 11 MG/DL (ref 8–23)
CALCIUM SERPL-MCNC: 8.8 MG/DL (ref 8.7–10.5)
CHLORIDE SERPL-SCNC: 104 MMOL/L (ref 95–110)
CO2 SERPL-SCNC: 25 MMOL/L (ref 23–29)
CREAT SERPL-MCNC: 0.8 MG/DL (ref 0.5–1.4)
DIFFERENTIAL METHOD: ABNORMAL
EOSINOPHIL # BLD AUTO: 0 K/UL (ref 0–0.5)
EOSINOPHIL NFR BLD: 0.5 % (ref 0–8)
ERYTHROCYTE [DISTWIDTH] IN BLOOD BY AUTOMATED COUNT: 11.8 % (ref 11.5–14.5)
EST. GFR  (AFRICAN AMERICAN): >60 ML/MIN/1.73 M^2
EST. GFR  (NON AFRICAN AMERICAN): >60 ML/MIN/1.73 M^2
GLUCOSE SERPL-MCNC: 124 MG/DL (ref 70–110)
HCT VFR BLD AUTO: 44.6 % (ref 40–54)
HGB BLD-MCNC: 14.9 G/DL (ref 14–18)
LYMPHOCYTES # BLD AUTO: 3.5 K/UL (ref 1–4.8)
LYMPHOCYTES NFR BLD: 41.1 % (ref 18–48)
MAGNESIUM SERPL-MCNC: 2 MG/DL (ref 1.6–2.6)
MCH RBC QN AUTO: 31.7 PG (ref 27–31)
MCHC RBC AUTO-ENTMCNC: 33.4 G/DL (ref 32–36)
MCV RBC AUTO: 95 FL (ref 82–98)
MONOCYTES # BLD AUTO: 1.1 K/UL (ref 0.3–1)
MONOCYTES NFR BLD: 12.3 % (ref 4–15)
NEUTROPHILS # BLD AUTO: 3.9 K/UL (ref 1.8–7.7)
NEUTROPHILS NFR BLD: 46.2 % (ref 38–73)
PLATELET # BLD AUTO: 186 K/UL (ref 150–350)
PMV BLD AUTO: 11.6 FL (ref 9.2–12.9)
POTASSIUM SERPL-SCNC: 3.8 MMOL/L (ref 3.5–5.1)
RBC # BLD AUTO: 4.7 M/UL (ref 4.6–6.2)
SODIUM SERPL-SCNC: 137 MMOL/L (ref 136–145)
WBC # BLD AUTO: 8.53 K/UL (ref 3.9–12.7)

## 2019-09-24 PROCEDURE — 83735 ASSAY OF MAGNESIUM: CPT

## 2019-09-24 PROCEDURE — 25000003 PHARM REV CODE 250: Performed by: EMERGENCY MEDICINE

## 2019-09-24 PROCEDURE — 63600175 PHARM REV CODE 636 W HCPCS: Performed by: EMERGENCY MEDICINE

## 2019-09-24 PROCEDURE — 25000242 PHARM REV CODE 250 ALT 637 W/ HCPCS: Performed by: EMERGENCY MEDICINE

## 2019-09-24 PROCEDURE — 94644 CONT INHLJ TX 1ST HOUR: CPT

## 2019-09-24 PROCEDURE — 99284 EMERGENCY DEPT VISIT MOD MDM: CPT | Mod: 25

## 2019-09-24 PROCEDURE — 94761 N-INVAS EAR/PLS OXIMETRY MLT: CPT

## 2019-09-24 PROCEDURE — 94640 AIRWAY INHALATION TREATMENT: CPT

## 2019-09-24 PROCEDURE — 85025 COMPLETE CBC W/AUTO DIFF WBC: CPT

## 2019-09-24 PROCEDURE — 80048 BASIC METABOLIC PNL TOTAL CA: CPT

## 2019-09-24 RX ORDER — BENZONATATE 200 MG/1
200 CAPSULE ORAL 3 TIMES DAILY PRN
Qty: 20 CAPSULE | Refills: 0 | Status: SHIPPED | OUTPATIENT
Start: 2019-09-24 | End: 2019-10-24

## 2019-09-24 RX ORDER — PREDNISONE 20 MG/1
60 TABLET ORAL
Status: COMPLETED | OUTPATIENT
Start: 2019-09-24 | End: 2019-09-24

## 2019-09-24 RX ORDER — HYDROCODONE BITARTRATE AND ACETAMINOPHEN 5; 325 MG/1; MG/1
1 TABLET ORAL EVERY 6 HOURS PRN
Qty: 12 TABLET | Refills: 0 | Status: SHIPPED | OUTPATIENT
Start: 2019-09-24 | End: 2019-09-27

## 2019-09-24 RX ORDER — DOXYCYCLINE HYCLATE 100 MG
100 TABLET ORAL
Status: COMPLETED | OUTPATIENT
Start: 2019-09-24 | End: 2019-09-24

## 2019-09-24 RX ORDER — ALBUTEROL SULFATE 2.5 MG/.5ML
10 SOLUTION RESPIRATORY (INHALATION) CONTINUOUS
Status: DISCONTINUED | OUTPATIENT
Start: 2019-09-24 | End: 2019-09-24 | Stop reason: HOSPADM

## 2019-09-24 RX ORDER — DOXYCYCLINE 100 MG/1
100 CAPSULE ORAL 2 TIMES DAILY
Qty: 20 CAPSULE | Refills: 0 | Status: SHIPPED | OUTPATIENT
Start: 2019-09-24 | End: 2019-10-04

## 2019-09-24 RX ORDER — PREDNISONE 10 MG/1
TABLET ORAL
Qty: 36 TABLET | Refills: 0 | Status: ON HOLD | OUTPATIENT
Start: 2019-09-24 | End: 2022-12-01 | Stop reason: HOSPADM

## 2019-09-24 RX ORDER — HYDROCODONE BITARTRATE AND ACETAMINOPHEN 5; 325 MG/1; MG/1
1 TABLET ORAL
Status: COMPLETED | OUTPATIENT
Start: 2019-09-24 | End: 2019-09-24

## 2019-09-24 RX ORDER — IPRATROPIUM BROMIDE AND ALBUTEROL SULFATE 2.5; .5 MG/3ML; MG/3ML
6 SOLUTION RESPIRATORY (INHALATION) ONCE
Status: COMPLETED | OUTPATIENT
Start: 2019-09-24 | End: 2019-09-24

## 2019-09-24 RX ADMIN — IPRATROPIUM BROMIDE AND ALBUTEROL SULFATE 6 ML: .5; 3 SOLUTION RESPIRATORY (INHALATION) at 09:09

## 2019-09-24 RX ADMIN — DOXYCYCLINE HYCLATE 100 MG: 100 TABLET, COATED ORAL at 06:09

## 2019-09-24 RX ADMIN — PREDNISONE 60 MG: 20 TABLET ORAL at 09:09

## 2019-09-24 RX ADMIN — ALBUTEROL SULFATE 10 MG: 2.5 SOLUTION RESPIRATORY (INHALATION) at 04:09

## 2019-09-24 RX ADMIN — HYDROCODONE BITARTRATE AND ACETAMINOPHEN 1 TABLET: 5; 325 TABLET ORAL at 06:09

## 2019-09-24 NOTE — ED NOTES
Pt ambulated around ER and oxygen saturations monitored. Pt saturating maintained at 93%. Pt denies feeling SOB

## 2019-09-24 NOTE — ED PROVIDER NOTES
Encounter Date: 9/24/2019       History     Chief Complaint   Patient presents with    Shortness of Breath     since 2130 last night, RA sats 92%, given duoneb and solumedrol with EMS, + wheezing upon EMS arrival      Patient presents via EMS for evaluation of worsening shortness of breath. Patient has history of COPD.  States he has been coughing and wheezing more than normal for 2-3 days.  States he was using his home nebulizer and it was helping until tonight.  States cough is productive.  No fever.  Complains of left-sided chest pain associated with coughing and deep breath. States that he has had pain over there off and on since he had rib fracture.  The denies nausea or vomiting. No abdominal pain. No diarrhea.  No leg swelling or calf pain. Patient still smokes cigarettes.  States he smokes x2 and half packs a day.  States he wheezes regularly.  He is not on home oxygen.  He was last seen in the emergency room 5 months ago.  Patient was given Solu-Medrol 125 mg IV and 1 DuoNeb by EMS.        Review of patient's allergies indicates:  No Known Allergies  Past Medical History:   Diagnosis Date    Arthritis     COPD (chronic obstructive pulmonary disease)     Hypertension     Prostate disorder     Thyroid disease      Past Surgical History:   Procedure Laterality Date    HAND SURGERY      left hand     Family History   Problem Relation Age of Onset    Cancer Mother     Cancer Father     Heart disease Father     Cancer Sister      Social History     Tobacco Use    Smoking status: Current Every Day Smoker     Packs/day: 3.00     Types: Cigarettes    Smokeless tobacco: Never Used   Substance Use Topics    Alcohol use: Yes     Alcohol/week: 4.0 standard drinks     Types: 4 Cans of beer per week     Comment: 5-6 beers a day    Drug use: Yes     Frequency: 7.0 times per week     Types: Marijuana     Review of Systems   Constitutional: Negative for chills, diaphoresis and fever.   HENT: Positive for  congestion. Negative for sore throat.    Eyes: Negative.  Negative for visual disturbance.   Respiratory: Positive for cough, shortness of breath and wheezing.    Cardiovascular: Positive for chest pain. Negative for palpitations and leg swelling.   Gastrointestinal: Negative for abdominal pain, diarrhea, nausea and vomiting.        NO melena or rectal bleeding   Genitourinary: Negative for flank pain.   Musculoskeletal: Negative for back pain.   Skin: Negative for rash and wound.   Neurological: Negative for dizziness, syncope, speech difficulty, weakness, numbness and headaches.        No numbness   Psychiatric/Behavioral: Negative for confusion.   All other systems reviewed and are negative.      Physical Exam     Initial Vitals   BP Pulse Resp Temp SpO2   09/24/19 0417 09/24/19 0417 09/24/19 0417 09/24/19 0614 09/24/19 0417   132/82 96 20 98 °F (36.7 °C) 96 %      MAP       --                Physical Exam    Nursing note and vitals reviewed.  Constitutional: He appears well-developed and well-nourished. He is not diaphoretic. No distress.   Uncomfortable from shortness of breath.   HENT:   Head: Normocephalic and atraumatic.   Nose: Nose normal.   Mouth/Throat: Oropharynx is clear and moist.   Eyes: Conjunctivae and EOM are normal. Pupils are equal, round, and reactive to light. Right eye exhibits no discharge. Left eye exhibits no discharge. No scleral icterus.   Neck: Neck supple. No tracheal deviation present.   Cardiovascular: Normal rate, regular rhythm and normal heart sounds.   No murmur heard.  Pulmonary/Chest: No stridor. No respiratory distress. He has wheezes. He has rhonchi. He has no rales. He exhibits tenderness (Left anterior and lateral chest.).   Abdominal: Soft. He exhibits no distension. There is no tenderness. There is no rebound and no guarding.   Musculoskeletal: Normal range of motion. He exhibits no edema or tenderness.   Neurological: He is alert and oriented to person, place, and time.    Skin: Skin is warm and dry. No rash noted.   Psychiatric: He has a normal mood and affect. His behavior is normal. Judgment and thought content normal.         ED Course   Procedures  Labs Reviewed   CBC W/ AUTO DIFFERENTIAL - Abnormal; Notable for the following components:       Result Value    Mean Corpuscular Hemoglobin 31.7 (*)     Mono # 1.1 (*)     All other components within normal limits   BASIC METABOLIC PANEL - Abnormal; Notable for the following components:    Glucose 124 (*)     All other components within normal limits   MAGNESIUM          Imaging Results          X-Ray Chest AP Portable (Final result)  Result time 09/24/19 04:36:58    Final result by Chace Jim MD (09/24/19 04:36:58)                 Impression:      Stable chronic appearing findings as above.  No definite radiographic evidence of acute intrathoracic process.      Electronically signed by: Chace Jim MD  Date:    09/24/2019  Time:    04:36             Narrative:    EXAMINATION:  XR CHEST AP PORTABLE    CLINICAL HISTORY:  Chest Pain;    TECHNIQUE:  Single frontal view of the chest was performed.    COMPARISON:  Chest radiograph 04/20/2019, 11/29/2018    FINDINGS:  Cardiomediastinal silhouette is stable in size.  There is atherosclerotic calcification of the thoracic aorta.  The lungs demonstrate coarse interstitial markings bilaterally.  There is stable linear opacity at the left lung base with blunting of the costophrenic angle which may relate to chronic scarring or small volume pleural fluid.  The remaining lungs demonstrate no confluent airspace consolidation.  There is no evidence of pneumothorax.  The visualized osseous structures demonstrate degenerative changes.  There are multiple chronic healed left-sided rib deformities.                                 Medical Decision Making:   Differential Diagnosis:   COPD, pneumonia  Clinical Tests:   Lab Tests: Ordered and Reviewed  The following lab test(s) were  unremarkable: CBC and CMP  Radiological Study: Ordered and Reviewed  ED Management:  0530:  No significant change chest x-ray.  Lab works unremarkable. Patient still has wheezing.  Work of breathing is improved.  Patient on continuous albuterol treatment.  Will reassess after treatments complete med determine if patient can be discharged.  Patient signed out to Dr. Sofia at shift change to follow up response treatment to determine disposition. BARTOLOME Raymond MD.    Patient discharged by Dr. Sofia after symptoms improved.                      Clinical Impression:       ICD-10-CM ICD-9-CM   1. COPD exacerbation J44.1 491.21   2. Rib pain on left side R07.81 786.50         Disposition:   Disposition: Discharged  Condition: Stable                        Keenan Raymond MD  09/24/19 2019

## 2019-09-24 NOTE — ED NOTES
"Pt sitting up in bed with oxygen saturations 93-94% on 2 L. Pt noted breathing with the use of accessory muscle. Pt reports " I feel better." But stated " I will never smoke a cigarette again. Pt handed the call light and instructed to call if in need of anything. Pt verbalized understanding   "

## 2019-09-24 NOTE — ED NOTES
Pt reporting that he feels like he can breathe much better than when he first arrived to ED; pt still reporting left sided lower ribcage area pain; pt informed of ordered medications and is aware of plan of care at this time

## 2019-09-24 NOTE — ED NOTES
RT at bedside. PT receiving breathing treatment. PT to be discharge after breathing treatment received

## 2019-09-24 NOTE — ED TRIAGE NOTES
"Pt reports to ED via EMS from home with c/o SOB, productive cough, and left sided rib cage pain (worse with touch and movement) ongoing a few days but worse last night; pt reports that he smokes 2.5 packs of cigarettes daily and always has a slight "wheeze" but tonight the SOB got really bad; pt has COPD; upon EMS arrival pt's SpO2 sat 92%, pt was give Solumedrol & duoneb treatment; pt still has duoneb treatment in place upon ED arrival; pt AAOx4  "

## 2019-11-03 ENCOUNTER — HOSPITAL ENCOUNTER (EMERGENCY)
Facility: HOSPITAL | Age: 64
Discharge: HOME | End: 2019-11-03
Attending: EMERGENCY MEDICINE
Payer: COMMERCIAL

## 2019-11-03 VITALS
TEMPERATURE: 99 F | HEART RATE: 86 BPM | RESPIRATION RATE: 58 BRPM | BODY MASS INDEX: 31.5 KG/M2 | WEIGHT: 196 LBS | DIASTOLIC BLOOD PRESSURE: 96 MMHG | OXYGEN SATURATION: 99 % | HEIGHT: 66 IN | SYSTOLIC BLOOD PRESSURE: 143 MMHG

## 2019-11-03 DIAGNOSIS — R07.9 ACUTE CHEST PAIN: ICD-10-CM

## 2019-11-03 DIAGNOSIS — R07.9 CHEST PAIN, MODERATE CORONARY ARTERY RISK: Primary | ICD-10-CM

## 2019-11-03 LAB
ALBUMIN SERPL BCP-MCNC: 4.3 G/DL (ref 3.5–5.2)
ALP SERPL-CCNC: 89 U/L (ref 55–135)
ALT SERPL W/O P-5'-P-CCNC: 14 U/L (ref 10–44)
ANION GAP SERPL CALC-SCNC: 10 MMOL/L (ref 8–16)
APTT BLDCRRT: 29.5 SEC (ref 21–32)
AST SERPL-CCNC: 16 U/L (ref 10–40)
BASOPHILS # BLD AUTO: 0.03 K/UL (ref 0–0.2)
BASOPHILS NFR BLD: 0.4 % (ref 0–1.9)
BILIRUB SERPL-MCNC: 0.7 MG/DL (ref 0.1–1)
BNP SERPL-MCNC: <10 PG/ML (ref 0–99)
BUN SERPL-MCNC: 9 MG/DL (ref 8–23)
CALCIUM SERPL-MCNC: 9.5 MG/DL (ref 8.7–10.5)
CHLORIDE SERPL-SCNC: 102 MMOL/L (ref 95–110)
CO2 SERPL-SCNC: 27 MMOL/L (ref 23–29)
CREAT SERPL-MCNC: 0.9 MG/DL (ref 0.5–1.4)
DIFFERENTIAL METHOD: NORMAL
EOSINOPHIL # BLD AUTO: 0.1 K/UL (ref 0–0.5)
EOSINOPHIL NFR BLD: 1.1 % (ref 0–8)
ERYTHROCYTE [DISTWIDTH] IN BLOOD BY AUTOMATED COUNT: 12.1 % (ref 11.5–14.5)
EST. GFR  (AFRICAN AMERICAN): >60 ML/MIN/1.73 M^2
EST. GFR  (NON AFRICAN AMERICAN): >60 ML/MIN/1.73 M^2
GLUCOSE SERPL-MCNC: 102 MG/DL (ref 70–110)
HCT VFR BLD AUTO: 44.8 % (ref 40–54)
HGB BLD-MCNC: 15 G/DL (ref 14–18)
IMM GRANULOCYTES # BLD AUTO: 0.02 K/UL (ref 0–0.04)
IMM GRANULOCYTES NFR BLD AUTO: 0.3 % (ref 0–0.5)
INR PPP: 0.9 (ref 0.8–1.2)
LYMPHOCYTES # BLD AUTO: 2.3 K/UL (ref 1–4.8)
LYMPHOCYTES NFR BLD: 30.6 % (ref 18–48)
MAGNESIUM SERPL-MCNC: 2.2 MG/DL (ref 1.6–2.6)
MCH RBC QN AUTO: 31 PG (ref 27–31)
MCHC RBC AUTO-ENTMCNC: 33.5 G/DL (ref 32–36)
MCV RBC AUTO: 93 FL (ref 82–98)
MONOCYTES # BLD AUTO: 0.8 K/UL (ref 0.3–1)
MONOCYTES NFR BLD: 11.2 % (ref 4–15)
NEUTROPHILS # BLD AUTO: 4.2 K/UL (ref 1.8–7.7)
NEUTROPHILS NFR BLD: 56.4 % (ref 38–73)
NRBC BLD-RTO: 0 /100 WBC
PLATELET # BLD AUTO: 260 K/UL (ref 150–350)
PMV BLD AUTO: 11.8 FL (ref 9.2–12.9)
POTASSIUM SERPL-SCNC: 3.9 MMOL/L (ref 3.5–5.1)
PROT SERPL-MCNC: 7.1 G/DL (ref 6–8.4)
PROTHROMBIN TIME: 9.8 SEC (ref 9–12.5)
RBC # BLD AUTO: 4.84 M/UL (ref 4.6–6.2)
SODIUM SERPL-SCNC: 139 MMOL/L (ref 136–145)
TROPONIN I SERPL DL<=0.01 NG/ML-MCNC: 0.01 NG/ML (ref 0–0.03)
WBC # BLD AUTO: 7.42 K/UL (ref 3.9–12.7)

## 2019-11-03 PROCEDURE — 83735 ASSAY OF MAGNESIUM: CPT

## 2019-11-03 PROCEDURE — 93005 ELECTROCARDIOGRAM TRACING: CPT

## 2019-11-03 PROCEDURE — 85025 COMPLETE CBC W/AUTO DIFF WBC: CPT

## 2019-11-03 PROCEDURE — 84484 ASSAY OF TROPONIN QUANT: CPT

## 2019-11-03 PROCEDURE — 25000003 PHARM REV CODE 250: Performed by: EMERGENCY MEDICINE

## 2019-11-03 PROCEDURE — 80053 COMPREHEN METABOLIC PANEL: CPT

## 2019-11-03 PROCEDURE — 93010 ELECTROCARDIOGRAM REPORT: CPT | Mod: ,,, | Performed by: INTERNAL MEDICINE

## 2019-11-03 PROCEDURE — 93010 EKG 12-LEAD: ICD-10-PCS | Mod: ,,, | Performed by: INTERNAL MEDICINE

## 2019-11-03 PROCEDURE — 85730 THROMBOPLASTIN TIME PARTIAL: CPT

## 2019-11-03 PROCEDURE — 83880 ASSAY OF NATRIURETIC PEPTIDE: CPT

## 2019-11-03 PROCEDURE — 85610 PROTHROMBIN TIME: CPT

## 2019-11-03 PROCEDURE — 99285 EMERGENCY DEPT VISIT HI MDM: CPT | Mod: 25

## 2019-11-03 RX ORDER — PANTOPRAZOLE SODIUM 40 MG/1
80 TABLET, DELAYED RELEASE ORAL
Status: DISCONTINUED | OUTPATIENT
Start: 2019-11-03 | End: 2019-11-03 | Stop reason: HOSPADM

## 2019-11-03 RX ORDER — MAG HYDROX/ALUMINUM HYD/SIMETH 200-200-20
60 SUSPENSION, ORAL (FINAL DOSE FORM) ORAL
Status: COMPLETED | OUTPATIENT
Start: 2019-11-03 | End: 2019-11-03

## 2019-11-03 RX ORDER — PANTOPRAZOLE SODIUM 40 MG/1
80 TABLET, DELAYED RELEASE ORAL
Status: COMPLETED | OUTPATIENT
Start: 2019-11-03 | End: 2019-11-03

## 2019-11-03 RX ORDER — PANTOPRAZOLE SODIUM 40 MG/1
40 TABLET, DELAYED RELEASE ORAL DAILY
Qty: 30 TABLET | Refills: 0 | Status: ON HOLD | OUTPATIENT
Start: 2019-11-03 | End: 2022-12-01 | Stop reason: HOSPADM

## 2019-11-03 RX ADMIN — PANTOPRAZOLE SODIUM 80 MG: 40 TABLET, DELAYED RELEASE ORAL at 02:11

## 2019-11-03 RX ADMIN — ALUMINUM HYDROXIDE, MAGNESIUM HYDROXIDE, AND SIMETHICONE 60 ML: 200; 200; 20 SUSPENSION ORAL at 02:11

## 2019-11-03 NOTE — ED TRIAGE NOTES
Pt arrives to ED via EMS from home with c/o of chest discomfort, left blurry vision, and left hand pain that started today. Pt states taking five tablets of baby aspirin. Pt also complains of dizziness and high blood pressure since Thursday; pt states they take their blood pressure daily. Pt A&O x4. Denies SOB.

## 2019-11-03 NOTE — DISCHARGE INSTRUCTIONS
Please return immediately if you get worse or if new problems develop.  Please follow-up with cardiologist tomorrow.  Please return if you change your mind about admission.  Medicines as directed.

## 2019-11-03 NOTE — ED PROVIDER NOTES
"Encounter Date: 11/3/2019    SCRIBE #1 NOTE: I, Adilson Rubin, am scribing for, and in the presence of,  Keith Chamberlain MD. I have scribed the following portions of the note - Other sections scribed: HPI/ROS.       History     Chief Complaint   Patient presents with    Chest Pain     EMS reports chest discomfort starting this morning with some associated left hand pain. deneis injury to the hand. appears anxious in triage. reports his blood pressure has been running high all day. reports taking all meds today.      CC: Burning to the Chest     HPI: This 64 y.o. male with a medical history of arthritis, COPD, HTN, and thyroid disease presents to ED via EMS for an emergent evaluation of a mild, burning sensation to the L pectoral chest beginning this AM. Pt reports some relief with the administered Nitroglycerin. He took x5 baby Aspirins PTA. Pt also reports L thumb pain that then radiated to the L, mid-palmar aspect today. No recent traumas or injuries to the hand or chest. He notes chronic SOB and cough that are stable. Of note, pt reports BP has been elevated for the last few days. He smokes cigarettes and occasional marijuana. He usually drinks 5-6 beers/day, but has been drinking 8-10 beers/day recently, as he is retired and "bored." He reports a surgery to the L palm about 30 years ago. Of note, his brother had MI x2. No personal hx of MI reported. No cholesterol problems, DM, or lung disease reported. No hx of any blood clots to the lungs or legs. He has not seen a cardiologist yet. No known allergies to medications. Otherwise, pt denies fever, chills, headache, sore throat, abdominal pain, n/v/d, dysuria, arm/leg trouble, leg pain, leg swelling, and any other associated symptoms.    The history is provided by the patient. No  was used.     Review of patient's allergies indicates:  No Known Allergies  Past Medical History:   Diagnosis Date    Arthritis     COPD (chronic obstructive " pulmonary disease)     Hypertension     Prostate disorder     Thyroid disease      Past Surgical History:   Procedure Laterality Date    HAND SURGERY      left hand     Family History   Problem Relation Age of Onset    Cancer Mother     Cancer Father     Heart disease Father     Cancer Sister      Social History     Tobacco Use    Smoking status: Current Every Day Smoker     Packs/day: 1.50     Types: Cigarettes    Smokeless tobacco: Never Used   Substance Use Topics    Alcohol use: Yes     Alcohol/week: 4.0 standard drinks     Types: 4 Cans of beer per week     Comment: 8-10 beers a day    Drug use: Yes     Frequency: 7.0 times per week     Types: Marijuana     Review of Systems   Constitutional: Negative for chills and fever.   HENT: Negative for congestion, ear pain, rhinorrhea and sore throat.    Eyes: Negative for pain and visual disturbance.   Respiratory: Positive for cough (chronic) and shortness of breath (chronic).    Cardiovascular: Positive for chest pain (burning). Negative for leg swelling.   Gastrointestinal: Negative for abdominal pain, diarrhea, nausea and vomiting.   Genitourinary: Negative for dysuria.   Musculoskeletal: Negative for back pain.        (+) L, palmar hand pain    Skin: Negative for rash.   Neurological: Negative for headaches.       Physical Exam     Initial Vitals [11/03/19 1223]   BP Pulse Resp Temp SpO2   (!) 146/89 93 20 98.4 °F (36.9 °C) 99 %      MAP       --         Physical Exam  The patient was examined specifically for the following:   General:No significant distress, Good color, Warm and dry. Head and neck:Scalp atraumatic, Neck supple. Neurological:Appropriate conversation, Gross motor deficits. Eyes:Conjugate gaze, Clear corneas. ENT: No epistaxis. Cardiac: Regular rate and rhythm, Grossly normal heart tones. Pulmonary: Wheezing, Rales. Gastrointestinal: Abdominal tenderness, Abdominal distention. Musculoskeletal: Extremity deformity, Apparent pain with  range of motion of the joints. Skin: Rash.   The findings on examination were normal except for the following:  Patient has mild scattered wheezing on pulmonary examination. There is no evidence of respiratory distress or shortness of breath.  ED Course   Procedures  Labs Reviewed   COMPREHENSIVE METABOLIC PANEL   CBC W/ AUTO DIFFERENTIAL   APTT   PROTIME-INR   TROPONIN I   B-TYPE NATRIURETIC PEPTIDE   MAGNESIUM     EKG Readings: (Independently Interpreted)   This patient is in a normal sinus rhythm with a heart rate of 96.  The QRS complexes borderline wide.  The patient is prolonged QT interval.  The p.r. intervals normal.  There is no evidence of acute myocardial infarction or malignant arrhythmia.  There is poor R-wave progression across the precordium.  Patient is a borderline wide QRS complex.       Imaging Results          X-Ray Chest AP Portable (Final result)  Result time 11/03/19 14:20:12    Final result by Bora Bennett MD (11/03/19 14:20:12)                 Impression:      Mild left basilar lung disease and small left effusion unchanged.      Electronically signed by: Bora Bennett  Date:    11/03/2019  Time:    14:20             Narrative:    EXAMINATION:  XR CHEST AP PORTABLE    CLINICAL HISTORY:  chest pain;    TECHNIQUE:  Single frontal view of the chest was performed.    COMPARISON:  09/24/2019 chest    FINDINGS:  Single AP portable view at 05:20 16:00.    Heart is normal size.  There is old healed left rib fractures.  There is mild hazy and patchy opacities at the left base.  This could be atelectasis or pneumonia and is similar in appearance with 09/24/2019.  There is probably a small left effusion.  No pneumothorax.  No interstitial edema or nodule.  The trachea appears normal.  Hilar shadows appear normal.                                    Additional MDM:   Heart Score:    History:          Slightly suspicious.  ECG:             Nonspecific repolarisation disturbance  Age:                45-65 years  Risk factors: >= 3 risk factors or history of atherosclerotic disease  Troponin:       Less than or equal to normal limit  Final Score: 4       Medical decision making:  Given the above this patient has some very vague mild burning pain left pectoral chest.  He has a heart score 4.  I will admit him for repeat cardiac markers and further evaluation by Hospital Medicine.  This patient refuses admission for rule out myocardial infarction.  The risks and benefits of admission and discharge have been explained.  The patient wishes to go home.  He believes that he mostly has anxiety. He will follow up with Cardiology.  He would like to follow up with Dr. Ulrich.   I will refer him to our cardiologist because I am not sure about follow-up outside of our system.  I will have the patient return if he gets worse or if new problems develop, or changes his mind about being admitted to the hospital.  The patient is capable.  I will have to except his AMA.       Scribe Attestation:   Scribe #1: I performed the above scribed service and the documentation accurately describes the services I performed. I attest to the accuracy of the note.           I personally performed the services described in this documentation.  All medical record  entries made by the scribe are at my direction and in my presence.  Signed, Dr. Chamberlain.     Clinical Impression:       ICD-10-CM ICD-9-CM   1. Chest pain, moderate coronary artery risk R07.9 786.50   2. Acute chest pain R07.9 786.50                                Keith Chamberlain MD  11/03/19 5689

## 2020-07-29 ENCOUNTER — HOSPITAL ENCOUNTER (EMERGENCY)
Facility: HOSPITAL | Age: 65
Discharge: HOME | End: 2020-07-29
Attending: EMERGENCY MEDICINE
Payer: COMMERCIAL

## 2020-07-29 VITALS
HEART RATE: 86 BPM | OXYGEN SATURATION: 97 % | RESPIRATION RATE: 18 BRPM | BODY MASS INDEX: 30.92 KG/M2 | SYSTOLIC BLOOD PRESSURE: 112 MMHG | HEIGHT: 67 IN | WEIGHT: 197 LBS | DIASTOLIC BLOOD PRESSURE: 67 MMHG | TEMPERATURE: 98 F

## 2020-07-29 DIAGNOSIS — I95.1 ORTHOSTASIS: Primary | ICD-10-CM

## 2020-07-29 DIAGNOSIS — R55 SYNCOPE: ICD-10-CM

## 2020-07-29 LAB
ALBUMIN SERPL BCP-MCNC: 4.1 G/DL (ref 3.5–5.2)
ALP SERPL-CCNC: 78 U/L (ref 55–135)
ALT SERPL W/O P-5'-P-CCNC: 16 U/L (ref 10–44)
ANION GAP SERPL CALC-SCNC: 7 MMOL/L (ref 8–16)
AST SERPL-CCNC: 15 U/L (ref 10–40)
BASOPHILS # BLD AUTO: 0.03 K/UL (ref 0–0.2)
BASOPHILS NFR BLD: 0.3 % (ref 0–1.9)
BILIRUB SERPL-MCNC: 0.8 MG/DL (ref 0.1–1)
BUN SERPL-MCNC: 10 MG/DL (ref 8–23)
CALCIUM SERPL-MCNC: 9.1 MG/DL (ref 8.7–10.5)
CHLORIDE SERPL-SCNC: 102 MMOL/L (ref 95–110)
CO2 SERPL-SCNC: 26 MMOL/L (ref 23–29)
CREAT SERPL-MCNC: 1.3 MG/DL (ref 0.5–1.4)
DIFFERENTIAL METHOD: ABNORMAL
EOSINOPHIL # BLD AUTO: 0.1 K/UL (ref 0–0.5)
EOSINOPHIL NFR BLD: 1.3 % (ref 0–8)
ERYTHROCYTE [DISTWIDTH] IN BLOOD BY AUTOMATED COUNT: 12.2 % (ref 11.5–14.5)
EST. GFR  (AFRICAN AMERICAN): >60 ML/MIN/1.73 M^2
EST. GFR  (NON AFRICAN AMERICAN): 58 ML/MIN/1.73 M^2
ETHANOL SERPL-MCNC: <10 MG/DL
GLUCOSE SERPL-MCNC: 100 MG/DL (ref 70–110)
HCT VFR BLD AUTO: 43.6 % (ref 40–54)
HGB BLD-MCNC: 14.5 G/DL (ref 14–18)
IMM GRANULOCYTES # BLD AUTO: 0.04 K/UL (ref 0–0.04)
IMM GRANULOCYTES NFR BLD AUTO: 0.4 % (ref 0–0.5)
LACTATE SERPL-SCNC: 1.4 MMOL/L (ref 0.5–2.2)
LIPASE SERPL-CCNC: 38 U/L (ref 4–60)
LYMPHOCYTES # BLD AUTO: 1.5 K/UL (ref 1–4.8)
LYMPHOCYTES NFR BLD: 16 % (ref 18–48)
MCH RBC QN AUTO: 31.3 PG (ref 27–31)
MCHC RBC AUTO-ENTMCNC: 33.3 G/DL (ref 32–36)
MCV RBC AUTO: 94 FL (ref 82–98)
MONOCYTES # BLD AUTO: 1.1 K/UL (ref 0.3–1)
MONOCYTES NFR BLD: 11.1 % (ref 4–15)
NEUTROPHILS # BLD AUTO: 6.8 K/UL (ref 1.8–7.7)
NEUTROPHILS NFR BLD: 70.9 % (ref 38–73)
NRBC BLD-RTO: 0 /100 WBC
PLATELET # BLD AUTO: 253 K/UL (ref 150–350)
PMV BLD AUTO: 11.1 FL (ref 9.2–12.9)
POTASSIUM SERPL-SCNC: 4.4 MMOL/L (ref 3.5–5.1)
PROT SERPL-MCNC: 6.7 G/DL (ref 6–8.4)
RBC # BLD AUTO: 4.64 M/UL (ref 4.6–6.2)
SODIUM SERPL-SCNC: 135 MMOL/L (ref 136–145)
TROPONIN I SERPL DL<=0.01 NG/ML-MCNC: <0.006 NG/ML (ref 0–0.03)
WBC # BLD AUTO: 9.65 K/UL (ref 3.9–12.7)

## 2020-07-29 PROCEDURE — 93010 ELECTROCARDIOGRAM REPORT: CPT | Mod: ,,, | Performed by: INTERNAL MEDICINE

## 2020-07-29 PROCEDURE — 80320 DRUG SCREEN QUANTALCOHOLS: CPT

## 2020-07-29 PROCEDURE — 84484 ASSAY OF TROPONIN QUANT: CPT

## 2020-07-29 PROCEDURE — 25000003 PHARM REV CODE 250: Performed by: EMERGENCY MEDICINE

## 2020-07-29 PROCEDURE — 96361 HYDRATE IV INFUSION ADD-ON: CPT

## 2020-07-29 PROCEDURE — 80053 COMPREHEN METABOLIC PANEL: CPT

## 2020-07-29 PROCEDURE — 93010 EKG 12-LEAD: ICD-10-PCS | Mod: ,,, | Performed by: INTERNAL MEDICINE

## 2020-07-29 PROCEDURE — 99285 EMERGENCY DEPT VISIT HI MDM: CPT | Mod: 25

## 2020-07-29 PROCEDURE — 83605 ASSAY OF LACTIC ACID: CPT

## 2020-07-29 PROCEDURE — 83690 ASSAY OF LIPASE: CPT

## 2020-07-29 PROCEDURE — 96360 HYDRATION IV INFUSION INIT: CPT

## 2020-07-29 PROCEDURE — 93005 ELECTROCARDIOGRAM TRACING: CPT

## 2020-07-29 PROCEDURE — 85025 COMPLETE CBC W/AUTO DIFF WBC: CPT

## 2020-07-29 RX ORDER — LISINOPRIL 10 MG/1
10 TABLET ORAL DAILY
Status: ON HOLD | COMMUNITY
End: 2022-12-01 | Stop reason: HOSPADM

## 2020-07-29 RX ADMIN — SODIUM CHLORIDE 1000 ML: 0.9 INJECTION, SOLUTION INTRAVENOUS at 03:07

## 2020-07-29 RX ADMIN — SODIUM CHLORIDE 500 ML: 0.9 INJECTION, SOLUTION INTRAVENOUS at 07:07

## 2020-07-29 NOTE — ED PROVIDER NOTES
Encounter Date: 7/29/2020    SCRIBE #1 NOTE: I, Millie Byers, am scribing for, and in the presence of,  Akil Osullivan MD. I have scribed the following portions of the note - Other sections scribed: HPI, ROS, PE.       History     Chief Complaint   Patient presents with    Dizziness     EMS reports patient was sitting watching TV when he stood up and became dizzy and weak and started seeing spots. EMS reports original BP 80/60 sitting before receiving fluids,    Weakness     CC: Dizziness    HPI: This 64 y.o male smoker, with a medical history of arthritis, COPD, hypertension, prostate disorder, and thyroid disease, presents to the ED via EMS transportation c/o acute dizziness that began today. Pt reports that he bent over to work on his air conditioner and then stood up to deal with his TV, but became dizzy and felt as though he was going to pass out. He states that he quickly sat down against his futon before he could fall. He denies any head trauma. Pt reports that he feels fine presently, but notes that he feels the symptoms will return if he attempts to stand up. Of note, pt reports that he typically drinks 6-8x 16 oz beers per day. He states that he last consumed his usual amount of beer yesterday, noting that he only drank a small amount today. Additionally, pt notes that he did not consume any food today as he did not feel hungry. He denies any previous episodes of similar symptoms. No other associated symptoms. No drug use.    The history is provided by the patient.     Review of patient's allergies indicates:  No Known Allergies  Past Medical History:   Diagnosis Date    Arthritis     COPD (chronic obstructive pulmonary disease)     Hypertension     Prostate disorder     Thyroid disease      Past Surgical History:   Procedure Laterality Date    HAND SURGERY      left hand     Family History   Problem Relation Age of Onset    Cancer Mother     Cancer Father     Heart disease Father      Cancer Sister      Social History     Tobacco Use    Smoking status: Current Every Day Smoker     Packs/day: 1.50     Types: Cigarettes    Smokeless tobacco: Never Used   Substance Use Topics    Alcohol use: Yes     Alcohol/week: 4.0 standard drinks     Types: 4 Cans of beer per week     Comment: 8-10 beers a day    Drug use: Yes     Frequency: 7.0 times per week     Types: Marijuana     Review of Systems   Constitutional: Negative.    HENT: Negative.    Eyes: Negative.    Respiratory: Negative.    Cardiovascular: Negative.    Gastrointestinal: Negative.    Genitourinary: Negative.    Musculoskeletal: Negative.    Skin: Negative.    Neurological: Positive for dizziness.       Physical Exam     Initial Vitals [07/29/20 1515]   BP Pulse Resp Temp SpO2   98/66 82 18 98.6 °F (37 °C) 96 %      MAP       --         Physical Exam    Nursing note and vitals reviewed.  Constitutional: He appears well-developed and well-nourished. He is not diaphoretic. No distress.   HENT:   Head: Normocephalic and atraumatic.   Nose: Nose normal.   Mouth/Throat: Oropharynx is clear and moist. No oropharyngeal exudate.   Eyes: EOM are normal. Pupils are equal, round, and reactive to light. Right eye exhibits no discharge. Left eye exhibits no discharge.   Neck: Normal range of motion. No tracheal deviation present. No JVD present.   Cardiovascular: Normal rate, regular rhythm and normal heart sounds.   No murmur heard.  Pulmonary/Chest: Breath sounds normal. No respiratory distress. He has no wheezes. He has no rhonchi. He has no rales.   Abdominal: Soft. Bowel sounds are normal. He exhibits no distension. There is no abdominal tenderness. There is no rebound and no guarding.   Musculoskeletal: Normal range of motion. No tenderness or edema.   Neurological: He is alert and oriented to person, place, and time. He has normal strength. No cranial nerve deficit.   Skin: Skin is warm. Capillary refill takes less than 2 seconds. No rash noted.  No erythema.   Psychiatric: He has a normal mood and affect.         ED Course   Procedures  Labs Reviewed   CBC W/ AUTO DIFFERENTIAL - Abnormal; Notable for the following components:       Result Value    Mean Corpuscular Hemoglobin 31.3 (*)     Mono # 1.1 (*)     Lymph% 16.0 (*)     All other components within normal limits   COMPREHENSIVE METABOLIC PANEL - Abnormal; Notable for the following components:    Sodium 135 (*)     Anion Gap 7 (*)     eGFR if non  58 (*)     All other components within normal limits    Narrative:     Recoll. [id number] by LM1 at 07/29/2020 17:06, reason:   HEMOLYZED/DISCAEDED/MODE   ALCOHOL,MEDICAL (ETHANOL)    Narrative:     Recoll. [id number] by LM1 at 07/29/2020 17:06, reason:   HEMOLYZED/DISCAEDED/MODE   LACTIC ACID, PLASMA   LIPASE    Narrative:     Recoll. [id number] by LM1 at 07/29/2020 17:06, reason:   HEMOLYZED/DISCAEDED/MODE   TROPONIN I    Narrative:     Recoll. [id number] by LM1 at 07/29/2020 17:06, reason:   HEMOLYZED/DISCAEDED/MODE        ECG Results          EKG 12-lead (In process)  Result time 07/29/20 16:41:22    In process by Interface, Lab In [facility] (07/29/20 16:41:22)                 Narrative:    Test Reason : R55,    Vent. Rate : 082 BPM     Atrial Rate : 082 BPM     P-R Int : 136 ms          QRS Dur : 104 ms      QT Int : 360 ms       P-R-T Axes : 036 121 032 degrees     QTc Int : 420 ms    Normal sinus rhythm  Left posterior fascicular block  Abnormal ECG  When compared with ECG of 03-NOV-2019 12:34,  Significant changes have occurred    Referred By: [email or web address]   SELF           Confirmed By:                   In process by Interface, Lab In [facility] (07/29/20 16:38:09)                 Narrative:    Test Reason : R55,    Vent. Rate : 082 BPM     Atrial Rate : 082 BPM     P-R Int : 136 ms          QRS Dur : 104 ms      QT Int : 360 ms       P-R-T Axes : 036 121 032 degrees     QTc Int : 420 ms    Normal sinus rhythm  Left  posterior fascicular block  Abnormal ECG  When compared with ECG of 03-NOV-2019 12:34,  Significant changes have occurred    Referred By: [email or web address]   SELF           Confirmed By:                             Imaging Results          X-Ray Chest 1 View (Final result)  Result time 07/29/20 17:11:00    Final result by Cecil England MD (07/29/20 17:11:00)                 Impression:      No significant change.      Electronically signed by: Cecil England MD  Date:    07/29/2020  Time:    17:11             Narrative:    EXAMINATION:  XR CHEST 1 VIEW    CLINICAL HISTORY:  Chest Pain;    TECHNIQUE:  Single frontal view of the chest was performed.    COMPARISON:  November 2019.    FINDINGS:  Heart is normal in size.  Lungs are symmetrically expanded.  There is opacification seen at the left lung base with chronic blunting of the left costophrenic angle.  Findings could reflect scarring or atelectasis and/or small left pleural effusion, noting that similar findings were seen on previous exam from November 2019.  No evidence of new focal consolidation.  No pneumothorax.  No significant right-sided effusion seen.  Remote healed left-sided rib fractures are again noted.                                 Medical Decision Making:   Clinical Tests:   Lab Tests: Ordered and Reviewed  The following lab test(s) were unremarkable: CBC, CMP, Urinalysis and Troponin  Radiological Study: Ordered and Reviewed  Medical Tests: Ordered and Reviewed  ED Management:  2030:  Lab work unremarkable.  Chest x-ray unremarkable.  Evidence cardiac arrhythmia or ischemia EKG.  No evidence acute coronary syndrome lab work.  Patient remains chest pain-free.  Neurologic exam remains normal.  Blood pressure stabilized after IV fluid.  Patient feeling better.  Patient stable for discharge.            Scribe Attestation:   Scribe #1: I performed the above scribed service and the documentation accurately describes the services I performed. I attest to the  accuracy of the note.        MDM:    64 y.o.male with PMHx as noted presents with dizziness, weakness and orthostasis. Physical exam remarkable for mildly distressed appearing male, conversing with ease, abdomen soft, nt/nd, CTAB, RRR.  ED workup remarkable for CBC wnl, pending CMP/ua/trop, EKG - nsr, rate 82 bpm, no ischemic pattern noted, no STEMI.  Pt presentation consistent with dizziness and weakness with orthostasis on exam.  Pt given IVFs with significant improvement in sxm.  Pending workup at this time.                           Clinical Impression:       ICD-10-CM ICD-9-CM   1. Orthostasis  I95.1 458.0   2. Syncope  R55 780.2         Disposition:   Disposition: Discharged  Condition: Stable     ED Disposition Condition    Discharge Stable        ED Prescriptions     None        Follow-up Information     Follow up With Specialties Details Why Contact Info    Heraclio Sims MD Internal Medicine Call  As needed [address] [address]  [phone]                        I, Keenan Raymond M.D., personally performed the services described in this documentation. All medical record entries made by the scribe were at my direction and in my presence. I have reviewed the chart and agree that the record reflects my personal performance and is accurate and complete.               Keenan Raymond MD  07/29/20 2029

## 2020-07-29 NOTE — ED TRIAGE NOTES
64 y.o male presents to the ED with chief complaint of dizziness and weakness. Pt reports standing up from watching TV and got lightheaded, weak, and dizzy and had to sit back down. Pt reports seeing spots. Pt reports he drinks about 6-7 beers a day, only drank a little today, trying to cut back. Pt denies CP, new SOB, abdominal, N/V/D. AAOx4, NAD. Denies pain.     Past Medical History:   Diagnosis Date    Arthritis     COPD (chronic obstructive pulmonary disease)     Hypertension     Prostate disorder     Thyroid disease

## 2020-09-06 ENCOUNTER — HOSPITAL ENCOUNTER (EMERGENCY)
Facility: HOSPITAL | Age: 65
Discharge: HOME | End: 2020-09-06
Attending: EMERGENCY MEDICINE
Payer: COMMERCIAL

## 2020-09-06 VITALS
OXYGEN SATURATION: 94 % | HEART RATE: 79 BPM | RESPIRATION RATE: 20 BRPM | WEIGHT: 197 LBS | HEIGHT: 67 IN | SYSTOLIC BLOOD PRESSURE: 91 MMHG | BODY MASS INDEX: 30.92 KG/M2 | DIASTOLIC BLOOD PRESSURE: 54 MMHG

## 2020-09-06 DIAGNOSIS — I95.9 HYPOTENSION: ICD-10-CM

## 2020-09-06 DIAGNOSIS — M54.9 BACK PAIN, UNSPECIFIED BACK LOCATION, UNSPECIFIED BACK PAIN LATERALITY, UNSPECIFIED CHRONICITY: ICD-10-CM

## 2020-09-06 DIAGNOSIS — J44.9 CHRONIC OBSTRUCTIVE PULMONARY DISEASE, UNSPECIFIED COPD TYPE: ICD-10-CM

## 2020-09-06 DIAGNOSIS — R06.2 WHEEZING: ICD-10-CM

## 2020-09-06 DIAGNOSIS — M54.2 NECK PAIN: Primary | ICD-10-CM

## 2020-09-06 LAB
ALBUMIN SERPL BCP-MCNC: 3.4 G/DL (ref 3.5–5.2)
ALP SERPL-CCNC: 74 U/L (ref 55–135)
ALT SERPL W/O P-5'-P-CCNC: 17 U/L (ref 10–44)
AMPHET+METHAMPHET UR QL: NEGATIVE
ANION GAP SERPL CALC-SCNC: 8 MMOL/L (ref 8–16)
AST SERPL-CCNC: 22 U/L (ref 10–40)
BARBITURATES UR QL SCN>200 NG/ML: NEGATIVE
BASOPHILS # BLD AUTO: 0.04 K/UL (ref 0–0.2)
BASOPHILS NFR BLD: 0.5 % (ref 0–1.9)
BENZODIAZ UR QL SCN>200 NG/ML: NORMAL
BILIRUB SERPL-MCNC: 0.4 MG/DL (ref 0.1–1)
BUN SERPL-MCNC: 9 MG/DL (ref 8–23)
BZE UR QL SCN: NORMAL
CALCIUM SERPL-MCNC: 8 MG/DL (ref 8.7–10.5)
CANNABINOIDS UR QL SCN: NEGATIVE
CHLORIDE SERPL-SCNC: 101 MMOL/L (ref 95–110)
CK SERPL-CCNC: 246 U/L (ref 20–200)
CO2 SERPL-SCNC: 21 MMOL/L (ref 23–29)
CREAT SERPL-MCNC: 0.8 MG/DL (ref 0.5–1.4)
CREAT UR-MCNC: 24.8 MG/DL (ref 23–375)
DIFFERENTIAL METHOD: ABNORMAL
EOSINOPHIL # BLD AUTO: 0.3 K/UL (ref 0–0.5)
EOSINOPHIL NFR BLD: 3.5 % (ref 0–8)
ERYTHROCYTE [DISTWIDTH] IN BLOOD BY AUTOMATED COUNT: 12.3 % (ref 11.5–14.5)
EST. GFR  (AFRICAN AMERICAN): >60 ML/MIN/1.73 M^2
EST. GFR  (NON AFRICAN AMERICAN): >60 ML/MIN/1.73 M^2
ETHANOL SERPL-MCNC: 203 MG/DL
GLUCOSE SERPL-MCNC: 74 MG/DL (ref 70–110)
HCT VFR BLD AUTO: 37.4 % (ref 40–54)
HGB BLD-MCNC: 12.5 G/DL (ref 14–18)
IMM GRANULOCYTES # BLD AUTO: 0.01 K/UL (ref 0–0.04)
IMM GRANULOCYTES NFR BLD AUTO: 0.1 % (ref 0–0.5)
LACTATE SERPL-SCNC: 1.2 MMOL/L (ref 0.5–2.2)
LIPASE SERPL-CCNC: 37 U/L (ref 4–60)
LIPASE SERPL-CCNC: 37 U/L (ref 4–60)
LYMPHOCYTES # BLD AUTO: 3.1 K/UL (ref 1–4.8)
LYMPHOCYTES NFR BLD: 38.7 % (ref 18–48)
MCH RBC QN AUTO: 32 PG (ref 27–31)
MCHC RBC AUTO-ENTMCNC: 33.4 G/DL (ref 32–36)
MCV RBC AUTO: 96 FL (ref 82–98)
METHADONE UR QL SCN>300 NG/ML: NEGATIVE
MONOCYTES # BLD AUTO: 0.9 K/UL (ref 0.3–1)
MONOCYTES NFR BLD: 11.4 % (ref 4–15)
NEUTROPHILS # BLD AUTO: 3.6 K/UL (ref 1.8–7.7)
NEUTROPHILS NFR BLD: 45.8 % (ref 38–73)
NRBC BLD-RTO: 0 /100 WBC
OPIATES UR QL SCN: NEGATIVE
PCP UR QL SCN>25 NG/ML: NEGATIVE
PLATELET # BLD AUTO: 219 K/UL (ref 150–350)
PMV BLD AUTO: 11.4 FL (ref 9.2–12.9)
POTASSIUM SERPL-SCNC: 4.1 MMOL/L (ref 3.5–5.1)
PROT SERPL-MCNC: 5.8 G/DL (ref 6–8.4)
RBC # BLD AUTO: 3.91 M/UL (ref 4.6–6.2)
SODIUM SERPL-SCNC: 130 MMOL/L (ref 136–145)
TOXICOLOGY INFORMATION: NORMAL
TSH SERPL DL<=0.005 MIU/L-ACNC: 3.19 UIU/ML (ref 0.4–4)
WBC # BLD AUTO: 7.89 K/UL (ref 3.9–12.7)

## 2020-09-06 PROCEDURE — 99284 EMERGENCY DEPT VISIT MOD MDM: CPT | Mod: 25

## 2020-09-06 PROCEDURE — 84443 ASSAY THYROID STIM HORMONE: CPT

## 2020-09-06 PROCEDURE — 80053 COMPREHEN METABOLIC PANEL: CPT

## 2020-09-06 PROCEDURE — 25000003 PHARM REV CODE 250: Performed by: EMERGENCY MEDICINE

## 2020-09-06 PROCEDURE — 80307 DRUG TEST PRSMV CHEM ANLYZR: CPT

## 2020-09-06 PROCEDURE — 82550 ASSAY OF CK (CPK): CPT

## 2020-09-06 PROCEDURE — 93010 EKG 12-LEAD: ICD-10-PCS | Mod: ,,, | Performed by: INTERNAL MEDICINE

## 2020-09-06 PROCEDURE — 96360 HYDRATION IV INFUSION INIT: CPT

## 2020-09-06 PROCEDURE — 93010 ELECTROCARDIOGRAM REPORT: CPT | Mod: ,,, | Performed by: INTERNAL MEDICINE

## 2020-09-06 PROCEDURE — 83690 ASSAY OF LIPASE: CPT

## 2020-09-06 PROCEDURE — 93005 ELECTROCARDIOGRAM TRACING: CPT

## 2020-09-06 PROCEDURE — 80320 DRUG SCREEN QUANTALCOHOLS: CPT

## 2020-09-06 PROCEDURE — 83605 ASSAY OF LACTIC ACID: CPT

## 2020-09-06 PROCEDURE — 85025 COMPLETE CBC W/AUTO DIFF WBC: CPT

## 2020-09-06 RX ADMIN — SODIUM CHLORIDE 1000 ML: 9 INJECTION, SOLUTION INTRAVENOUS at 08:09

## 2020-09-07 NOTE — ED PROVIDER NOTES
"Encounter Date: 9/6/2020       History     Chief Complaint   Patient presents with    Neck Pain     fall 9 days ago on concrete, took ibuprofen but did not help, reports drinking 11 beers     63 yo male ("Da") presents via [facility] with neck and back pain and slurred speech. Patient reports that he fell on the concrete steps of the 's office around 9 days ago. He has had neck and back pain since that time. He denies LOC. Today patient drank 11 beers and also took rx'ed Klonopin. Son called EMS.    Patient states that his son actually called 911 because patient called 911 on his son for an overdose 2 days ago and his son is angry at him for it.  Of note, I saw son MRN [id number] ("Scooter") here for said overdose 9/4/20.     PMH: HTN, COPD, prostate disorder, thyroid disease, arthritis  PSH: L hand        Review of patient's allergies indicates:  No Known Allergies  Past Medical History:   Diagnosis Date    Arthritis     COPD (chronic obstructive pulmonary disease)     Hypertension     Prostate disorder     Thyroid disease      Past Surgical History:   Procedure Laterality Date    HAND SURGERY      left hand     Family History   Problem Relation Age of Onset    Cancer Mother     Cancer Father     Heart disease Father     Cancer Sister      Social History     Tobacco Use    Smoking status: Current Every Day Smoker     Packs/day: 1.50     Types: Cigarettes    Smokeless tobacco: Never Used   Substance Use Topics    Alcohol use: Yes     Alcohol/week: 4.0 standard drinks     Types: 4 Cans of beer per week     Comment: 8-10 beers a day    Drug use: Yes     Frequency: 7.0 times per week     Types: Marijuana     Review of Systems   Constitutional: Negative for fever.   HENT: Negative for sore throat.    Eyes: Negative for photophobia.   Respiratory: Positive for wheezing (chronic).    Cardiovascular: Negative for leg swelling.   Gastrointestinal: Negative for vomiting.   Genitourinary: Negative " for dysuria.   Musculoskeletal: Positive for back pain and neck pain. Negative for gait problem.   Skin: Negative for rash.   Neurological: Negative for syncope.       Physical Exam     Initial Vitals [09/06/20 2036]   BP Pulse Resp Temp SpO2   94/64 86 15 -- 95 %      MAP       --         Physical Exam    Nursing note and vitals reviewed.  Constitutional: He appears well-developed and well-nourished. He is not diaphoretic.   Awake, alert adult male, lying on EMS stretcher. Slurred speech.   HENT:   Head: Normocephalic and atraumatic.   Eyes: Conjunctivae and EOM are normal.   Dilated pupils, 5mm, reactive   Neck: Normal range of motion. Neck supple.   No midline cspine TTP or stepoffs. +paraspinal TTP diffusely. Able to flex/extend and rotate normally.   Cardiovascular: Normal rate, regular rhythm, normal heart sounds and intact distal pulses.   No murmur heard.  Pulmonary/Chest: He has wheezes (diffuse expiratory). He has no rhonchi. He has no rales.   Abdominal: Soft. There is no abdominal tenderness.   Musculoskeletal: Normal range of motion. Tenderness present.   Neurological: He is alert and oriented to person, place, and time. He has normal strength.   Moving all extremities   Skin: Skin is warm and dry.   Psychiatric: His behavior is normal.         ED Course   Procedures  Labs Reviewed   CBC W/ AUTO DIFFERENTIAL - Abnormal; Notable for the following components:       Result Value    RBC 3.91 (*)     Hemoglobin 12.5 (*)     Hematocrit 37.4 (*)     Mean Corpuscular Hemoglobin 32.0 (*)     All other components within normal limits   COMPREHENSIVE METABOLIC PANEL - Abnormal; Notable for the following components:    Sodium 130 (*)     CO2 21 (*)     Calcium 8.0 (*)     Total Protein 5.8 (*)     Albumin 3.4 (*)     All other components within normal limits   CK - Abnormal; Notable for the following components:     (*)     All other components within normal limits   ALCOHOL,MEDICAL (ETHANOL) - Abnormal;  Notable for the following components:    Alcohol, Medical, Serum 203 (*)     All other components within normal limits   LIPASE   TSH   LIPASE   DRUG SCREEN PANEL, URINE EMERGENCY    Narrative:     Specimen Source->Urine   LACTIC ACID, PLASMA     EKG Readings: (Independently Interpreted)   20:58: NSR, HR 80. Normal axis. No ectopy. No STEMI.      ECG Results          EKG 12-lead (Final result)  Result time 09/07/20 21:16:29    Final result by Interface, Lab In [facility] (09/07/20 21:16:29)                 Narrative:    Test Reason : I95.9,    Vent. Rate : 080 BPM     Atrial Rate : 080 BPM     P-R Int : 180 ms          QRS Dur : 104 ms      QT Int : 376 ms       P-R-T Axes : 065 071 057 degrees     QTc Int : 433 ms    Normal sinus rhythm  Normal ECG  When compared with ECG of 29-JUL-2020 15:28,  Significant changes have occurred  Confirmed by Joann PELLETIER, Basilio SANTIAGO ([id number]) on 9/7/2020 9:16:19 PM    Referred By: [email or web address]   SELF           Confirmed By:Basilio David MD                            Imaging Results    None          Medical Decision Making:   History:   Old Medical Records: I decided to obtain old medical records.  Old Records Summarized: records from previous admission(s).  Initial Assessment:   64 y.o. male s/p fall 9 days ago with neck and back pain. EtOH on board, also Klonopin.   Differential Diagnosis:   Ddx includes fracture, muscle strain/sprain, toxidrome, other.  Independently Interpreted Test(s):   I have ordered and independently interpreted EKG Reading(s) - see prior notes  Clinical Tests:   Radiological Study: Ordered and Reviewed  ED Management:  EKG no STEMI.     Patient declined all ED interventions. He did not want workup for recent fall. Patient states his son called 911 on him essentially as payback for his calling 911 on his son 2 days ago when his son OD'ed. Patient states he understands that he could have undiagnosed serious pathology and could die from this pathology. He signed AMA. Left,  ambulatory with steady gait.                                  Clinical Impression:       ICD-10-CM ICD-9-CM   1. Neck pain  M54.2 723.1   2. Hypotension  I95.9 458.9   3. Back pain, unspecified back location, unspecified back pain laterality, unspecified chronicity  M54.9 724.5   4. Wheezing  R06.2 786.07   5. Chronic obstructive pulmonary disease, unspecified COPD type  J44.9 496             ED Disposition Condition    AMJUANITA Metz MD  09/11/20 1277

## 2020-09-07 NOTE — ED NOTES
"Pt removed IV stating "there is nothing wrong with me, I have to go" Pt ambulated with steady gait. MD not available at this time. Pt escorted back to room, security called to bedside. Call bell was present at the time of the incident.   "

## 2020-09-07 NOTE — ED NOTES
Pt states theres no reason for him to be here. Nurse explained that his bp is low and he should stay.MD at bedside.  Pt allowed to leave AMA, if ride is available.

## 2020-09-07 NOTE — ED TRIAGE NOTES
Pt here with c/o neck and back pain after falling 9 days ago. EMS reports pt drinks beer daily. Pt reports taking ibuprofen for pain with no relief.

## 2022-10-27 ENCOUNTER — HOSPITAL ENCOUNTER (EMERGENCY)
Facility: HOSPITAL | Age: 67
Discharge: PSYCHIATRIC OR SUBSTANCE USE FACILITY | End: 2022-10-28
Attending: EMERGENCY MEDICINE
Payer: MEDICARE

## 2022-10-27 DIAGNOSIS — F19.10 POLYSUBSTANCE ABUSE: ICD-10-CM

## 2022-10-27 DIAGNOSIS — R46.2 BIZARRE BEHAVIOR: Primary | ICD-10-CM

## 2022-10-27 LAB
ALBUMIN SERPL BCP-MCNC: 4.2 G/DL (ref 3.5–5.2)
ALP SERPL-CCNC: 87 U/L (ref 55–135)
ALT SERPL W/O P-5'-P-CCNC: 12 U/L (ref 10–44)
AMPHET+METHAMPHET UR QL: ABNORMAL
ANION GAP SERPL CALC-SCNC: 7 MMOL/L (ref 8–16)
APAP SERPL-MCNC: <3 UG/ML (ref 10–20)
AST SERPL-CCNC: 16 U/L (ref 10–40)
BARBITURATES UR QL SCN>200 NG/ML: NEGATIVE
BASOPHILS # BLD AUTO: 0.05 K/UL (ref 0–0.2)
BASOPHILS NFR BLD: 0.5 % (ref 0–1.9)
BENZODIAZ UR QL SCN>200 NG/ML: ABNORMAL
BILIRUB SERPL-MCNC: 0.6 MG/DL (ref 0.1–1)
BILIRUB UR QL STRIP: NEGATIVE
BUN SERPL-MCNC: 10 MG/DL (ref 8–23)
BZE UR QL SCN: NEGATIVE
CALCIUM SERPL-MCNC: 9.4 MG/DL (ref 8.7–10.5)
CANNABINOIDS UR QL SCN: NEGATIVE
CHLORIDE SERPL-SCNC: 104 MMOL/L (ref 95–110)
CLARITY UR: CLEAR
CO2 SERPL-SCNC: 24 MMOL/L (ref 23–29)
COLOR UR: YELLOW
CREAT SERPL-MCNC: 0.8 MG/DL (ref 0.5–1.4)
CREAT UR-MCNC: 165.5 MG/DL (ref 23–375)
CTP QC/QA: YES
DIFFERENTIAL METHOD: ABNORMAL
EOSINOPHIL # BLD AUTO: 0.2 K/UL (ref 0–0.5)
EOSINOPHIL NFR BLD: 2 % (ref 0–8)
ERYTHROCYTE [DISTWIDTH] IN BLOOD BY AUTOMATED COUNT: 12 % (ref 11.5–14.5)
EST. GFR  (NO RACE VARIABLE): >60 ML/MIN/1.73 M^2
ETHANOL SERPL-MCNC: <10 MG/DL
GLUCOSE SERPL-MCNC: 83 MG/DL (ref 70–110)
GLUCOSE UR QL STRIP: NEGATIVE
HCT VFR BLD AUTO: 40.3 % (ref 40–54)
HGB BLD-MCNC: 13.8 G/DL (ref 14–18)
HGB UR QL STRIP: NEGATIVE
IMM GRANULOCYTES # BLD AUTO: 0.05 K/UL (ref 0–0.04)
IMM GRANULOCYTES NFR BLD AUTO: 0.5 % (ref 0–0.5)
KETONES UR QL STRIP: NEGATIVE
LEUKOCYTE ESTERASE UR QL STRIP: NEGATIVE
LYMPHOCYTES # BLD AUTO: 2.9 K/UL (ref 1–4.8)
LYMPHOCYTES NFR BLD: 27.3 % (ref 18–48)
MCH RBC QN AUTO: 30.7 PG (ref 27–31)
MCHC RBC AUTO-ENTMCNC: 34.2 G/DL (ref 32–36)
MCV RBC AUTO: 90 FL (ref 82–98)
METHADONE UR QL SCN>300 NG/ML: NEGATIVE
MONOCYTES # BLD AUTO: 1.3 K/UL (ref 0.3–1)
MONOCYTES NFR BLD: 11.9 % (ref 4–15)
NEUTROPHILS # BLD AUTO: 6.2 K/UL (ref 1.8–7.7)
NEUTROPHILS NFR BLD: 57.8 % (ref 38–73)
NITRITE UR QL STRIP: NEGATIVE
NRBC BLD-RTO: 0 /100 WBC
OPIATES UR QL SCN: NEGATIVE
PCP UR QL SCN>25 NG/ML: NEGATIVE
PH UR STRIP: 6 [PH] (ref 5–8)
PLATELET # BLD AUTO: 287 K/UL (ref 150–450)
PMV BLD AUTO: 11.5 FL (ref 9.2–12.9)
POTASSIUM SERPL-SCNC: 4.3 MMOL/L (ref 3.5–5.1)
PROT SERPL-MCNC: 7.1 G/DL (ref 6–8.4)
PROT UR QL STRIP: ABNORMAL
RBC # BLD AUTO: 4.5 M/UL (ref 4.6–6.2)
SARS-COV-2 RDRP RESP QL NAA+PROBE: NEGATIVE
SODIUM SERPL-SCNC: 135 MMOL/L (ref 136–145)
SP GR UR STRIP: 1.02 (ref 1–1.03)
T4 FREE SERPL-MCNC: 0.88 NG/DL (ref 0.71–1.51)
TOXICOLOGY INFORMATION: ABNORMAL
TSH SERPL DL<=0.005 MIU/L-ACNC: 10.28 UIU/ML (ref 0.4–4)
URN SPEC COLLECT METH UR: ABNORMAL
UROBILINOGEN UR STRIP-ACNC: NEGATIVE EU/DL
WBC # BLD AUTO: 10.67 K/UL (ref 3.9–12.7)

## 2022-10-27 PROCEDURE — 80307 DRUG TEST PRSMV CHEM ANLYZR: CPT | Performed by: EMERGENCY MEDICINE

## 2022-10-27 PROCEDURE — 87635 SARS-COV-2 COVID-19 AMP PRB: CPT | Performed by: EMERGENCY MEDICINE

## 2022-10-27 PROCEDURE — 84443 ASSAY THYROID STIM HORMONE: CPT | Performed by: EMERGENCY MEDICINE

## 2022-10-27 PROCEDURE — 85025 COMPLETE CBC W/AUTO DIFF WBC: CPT | Performed by: EMERGENCY MEDICINE

## 2022-10-27 PROCEDURE — 81003 URINALYSIS AUTO W/O SCOPE: CPT | Mod: 59 | Performed by: EMERGENCY MEDICINE

## 2022-10-27 PROCEDURE — 99285 EMERGENCY DEPT VISIT HI MDM: CPT | Mod: 25

## 2022-10-27 PROCEDURE — 82077 ASSAY SPEC XCP UR&BREATH IA: CPT | Performed by: EMERGENCY MEDICINE

## 2022-10-27 PROCEDURE — G0425 INPT/ED TELECONSULT30: HCPCS | Mod: 95,,, | Performed by: PSYCHIATRY & NEUROLOGY

## 2022-10-27 PROCEDURE — G0425 PR INPT TELEHEALTH CONSULT 30M: ICD-10-PCS | Mod: 95,,, | Performed by: PSYCHIATRY & NEUROLOGY

## 2022-10-27 PROCEDURE — 80143 DRUG ASSAY ACETAMINOPHEN: CPT | Performed by: EMERGENCY MEDICINE

## 2022-10-27 PROCEDURE — 80053 COMPREHEN METABOLIC PANEL: CPT | Performed by: EMERGENCY MEDICINE

## 2022-10-27 PROCEDURE — 84439 ASSAY OF FREE THYROXINE: CPT | Performed by: EMERGENCY MEDICINE

## 2022-10-28 VITALS
BODY MASS INDEX: 26.68 KG/M2 | HEIGHT: 67 IN | SYSTOLIC BLOOD PRESSURE: 91 MMHG | OXYGEN SATURATION: 98 % | TEMPERATURE: 98 F | DIASTOLIC BLOOD PRESSURE: 50 MMHG | RESPIRATION RATE: 18 BRPM | WEIGHT: 170 LBS | HEART RATE: 88 BPM

## 2022-10-28 NOTE — ED PROVIDER NOTES
SCRIBE #1 NOTE: I, Larry Walton, am scribing for, and in the presence of,  Gerri Hebert MD.         EM PHYSICIAN NOTE       This patient presents with a complaint of   Chief Complaint   Patient presents with    Bizarre Behavior     Patient brought in per [facility] police. Patient appears to be intoxicated at this time.        HPI: Adrien Mckeon is a 66 y.o. male with a PMHx of COPD, HTN who presents to the Emergency Department via [facility] police for evaluation of bizarre behavior. Patient explains he was walking the neighborhood and asked a group if they would like to purchase a gun. He states he did not have the gun on him at that time. Patient reports that he was then approached by police and brought into the ED. Patient states that his wife told the police that he had been drinking all day. He denies this and only endorses drinking two beers prior to napping at 1400. Patient was supposedly making vague threats while in triage. He denies any somatic complaints at this time.      REVIEW of PMH, SOC History and Family History:  Past Medical History:   Diagnosis Date    Arthritis     COPD (chronic obstructive pulmonary disease)     Hypertension     Prostate disorder     Thyroid disease      Social history noncontributory  Family history noncontributory  Review of patient's allergies indicates:  No Known Allergies        REVIEW of SYSTEMS  Source: Patient  The nurse's notes and triage vital signs were reviewed.  GENERAL/CONSTITUTIONAL: There is no report of fever, fatigue, weakness, or unexplained weight loss.  CARDIOVASCULAR: There is no report of chest pain   RESPIRATORY: There is no report of cough or SOB  GASTROINTESTINAL: There is no report of nausea, vomiting, diarrhea  MUSCULOSKELETAL: There is no report of joint or muscle pain. No muscle weakness or tenderness.  SKIN AND BREASTS: There is no report of easy bruising, skin redness, skin rash.  HEMATOLOGIC/LYMPHATIC: There is no report of anemia,  "bleeding or clotting defects. There is no report of anticoagulant use.  The remainder of the ROS is negative.        PHYSICAL EXAMINATION    ED Triage Vitals [10/27/22 2044]   Enc Vitals Group      /87      Pulse (!) 112      Resp 18      Temp 97.3 °F (36.3 °C)      Temp src Oral      SpO2 97 %      Weight 170 lb      Height 5' 7"      Head Circumference       Peak Flow       Pain Score       Pain Loc       Pain Edu?       Excl. in GC?      Vital signs and Pulse Ox reviewed in clinical context. Abnormalities noted: Tachycardia  Pt's level of consciousness is alert, and the patient is in mild distress.  Skin: warm, pink and dry.  Capillary refill is less than 2 seconds.  Mucosa:moist  Head and Neck: WNL  Cardiac exam: RRR  Pulmonary exam: unlabored and clear  Abd Exam: soft nontender   Musculoskeletal: no joint tenderness, deformity or swelling   Neurologic: GCS: GCS 15; 5 over 5 strength, cranial nerves intact, neck supple. Normal gait.  Psychiatric: Slurred speech. Slightly manic with flight of ideas.       Initial Impression:  Bizarre behavior  Plan:  Labs, psych consult  Micelle NINA Hebert MD, 8:47 PM 10/27/2022      Medical decision making:   Nurses notes and Vital Signs reviewed.  Orders Placed This Encounter   Procedures    CBC auto differential    Comprehensive metabolic panel    TSH    Urinalysis, Reflex to Urine Culture Urine, Clean Catch    Drug screen panel, emergency    Ethanol    Acetaminophen level    T4, Free    Diet Adult Regular (IDDSI Level 7)    Vital signs    Undress patient and allow them to wear facility provided apparel.    Direct Psych Observation    Inpatient consult to Telemedicine - Psychiatry    POCT COVID-19 Rapid Screening    PFC Facilitated Request from Carolyn Moreira, and [facility]    PEC/Psych Hold - Physicians Emergency Certificate / 72 Hour Psych Hold       ED Course as of 10/28/22 0125   Thu Oct 27, 2022   2240 Drug screen is positive for amphetamines and benzo [MH] "   2241 Urine reveals trace protein [MH]   2241 TSH is 10 with a normal free T4 [MH]   2241 CMP and CBC are normal [MH]   2241 COVID negative [MH]   2241 ETOH negative [MH]   2241 Medically clear for psychiatric consult [MH]   2244 Care of patient handed over to oncoming team pending CT head and psychiatric consultation [MH]      ED Course User Index  [] Gerri Hebert MD       MDM     Amount and/or Complexity of Data Reviewed  Clinical lab tests: ordered and reviewed  Decide to obtain previous medical records or to obtain history from someone other than the patient: yes         Diagnoses that have been ruled out:   None   Diagnoses that are still under consideration:   None   Final diagnoses:   Bizarre behavior   Polysubstance abuse            Follow-up Information    None       ED Prescriptions    None         This note was created using [employer].  This program may occasionally misinterpret certain words and phrases.      SCRIBE ATTESTATION NOTE:   I attest that I personally performed the services documented by the scribe and acknowledged and confirm the content of the note.   Nurses notes were reviewed.  Gerri Hebert      Nurses notes were reviewed.      CRITICAL CARE TIME:  These services included the following: chart data review, reviewing nursing notes and researching old charts from internal and external sources, documentation time, consultant collaboration regarding findings and treatment options, medication orders and management, direct patient care, vital sign assessments, physical exam reassessments, and ordering, interpreting and reviewing diagnostic studies/lab tests.    Aggregate critical care time was approximately 10 minutes, which includes only time during which I was engaged in work directly related to the patient's care, as described above, whether at the bedside or elsewhere in the Emergency Department.  It did not include time spent performing other reported procedures or the  services of residents, students, nurses or physician assistants.        Transfer of Care:          ED Disposition Condition    Transfer to Psych Facility Stable                       This document was produced by a scribe under my direction and in my presence. I agree with the content of the note and have made any necessary edits.     Dr. Morocho    10/28/2022 1:24 AM       Madi Morocho MD  10/28/22 0125

## 2022-10-28 NOTE — ED NOTES
Attempted to take patient to CT. Patient reports is not going anywhere and became verbal. Security at bedside. MD aware.

## 2022-10-28 NOTE — CONSULTS
[facility]  Psychiatry  Telepsychiatry Consult Note    Please see previous notes:    Patient agreeable to consultation via telepsychiatry.    Tele-Consultation from Psychiatry started: 10/27/2022 at 1120  The chief complaint leading to psychiatric consultation is: gravely disabled  This consultation was requested by the Emergency Department attending physician.  The location of the consulting psychiatrist is  [facility] .  The patient location is  [facility] EMERGENCY DEPARTMENT   The patient arrived at the ED at: 7pm    Also present with the patient at the time of the consultation: rn    Patient Identification:   Adrien Mckeon is a 66 y.o. male.    Patient information was obtained from patient.  Patient presented involuntarily to the Emergency Department by ambulance where the patient received see Ambulance Run Sheet prior to arrival.    Consults  Teleconsult Time Documentation  Subjective:     History of Present Illness:  No notes on file Adrien Mckeon is a 66 y.o. male with a PMHx of COPD, HTN who presents to the Emergency Department via [place] police for evaluation of bizarre behavior. Patient explains he was walking the neighborhood and asked a group if they would like to purchase a gun.    Psychiatric History:   Previous Psychiatric Hospitalizations: Yes   Previous Medication Trials: No   Previous Suicide Attempts: no   History of Violence: yes  History of Depression: no  History of Ginny: yes  History of Auditory/Visual Hallucination yes  History of Delusions: yes  Outpatient psychiatrist (current & past): No    Substance Abuse History:  Tobacco:No  Alcohol: Yes  Illicit Substances:Yes  Detox/Rehab: No    Legal History: Past charges/incarcerations: No     Family Psychiatric History: denies      Social History:  Developmental/Childhood:Achieved all developmental milestones timely  *Education:High School Diploma  Employment Status/Finances:Disabled   Relationship Status/Sexual Orientation: Single:   "Relationship cutoff  Children:  unk  Housing Status: Homeless    history:  NO  Access to gun: YES:      Anabaptism:Spiritual without formal affiliation  Recreational activities:Exercise    Psychiatric Mental Status Exam:  Arousal: alert  Sensorium/Orientation: oriented to grossly intact  Behavior/Cooperation: normal, cooperative   Speech: normal tone, normal rate, normal pitch, normal volume  Language: grossly intact  Mood: " bad "   Affect: labile  Thought Process: loose associations, illogical  Thought Content:   Auditory hallucinations: NO  Visual hallucinations: NO  Paranoia: YES:      Delusions:  YES:      Suicidal ideation: NO  Homicidal ideation: NO  Attention/Concentration:  unable to spell "WORLD" backwards  Memory:    Recent:  Decreased   Remote: Decreased   3/3 immediate, 0/3 at 5 min  Fund of Knowledge: Impaired   Abstract reasoning: proverbs were concrete  Insight: poor awareness of illness  Judgment: behavior is adequate to circumstances, limited      Past Medical History:   Past Medical History:   Diagnosis Date    Arthritis     COPD (chronic obstructive pulmonary disease)     Hypertension     Prostate disorder     Thyroid disease       Laboratory Data:   Labs Reviewed   CBC W/ AUTO DIFFERENTIAL - Abnormal; Notable for the following components:       Result Value    RBC 4.50 (*)     Hemoglobin 13.8 (*)     Immature Grans (Abs) 0.05 (*)     Mono # 1.3 (*)     All other components within normal limits   COMPREHENSIVE METABOLIC PANEL - Abnormal; Notable for the following components:    Sodium 135 (*)     Anion Gap 7 (*)     All other components within normal limits   TSH - Abnormal; Notable for the following components:    TSH 10.276 (*)     All other components within normal limits   URINALYSIS, REFLEX TO URINE CULTURE - Abnormal; Notable for the following components:    Protein, UA Trace (*)     All other components within normal limits    Narrative:     Specimen Source->Urine   DRUG SCREEN " PANEL, URINE EMERGENCY - Abnormal; Notable for the following components:    Benzodiazepines Presumptive Positive (*)     Amphetamine Screen, Ur Presumptive Positive (*)     All other components within normal limits    Narrative:     Specimen Source->Urine   ACETAMINOPHEN LEVEL - Abnormal; Notable for the following components:    Acetaminophen (Tylenol), Serum <3.0 (*)     All other components within normal limits   SARS-COV-2 RDRP GENE - Normal   ALCOHOL,MEDICAL (ETHANOL)   T4, FREE       Neurological History:  Seizures: No  Head trauma: No    Allergies:   Review of patient's allergies indicates:  No Known Allergies    Medications in ER: Medications - No data to display    Medications at home: denies    No new subjective & objective note has been filed under this hospital service since the last note was generated.      Assessment - Diagnosis - Goals:     Diagnosis/Impression: unspecified schizophrenia spectrum and other psychotic disorder    Rec: PEC and inpt treatment - disorganized, delusional behavior, danger to self and others     Time with patient: 10 min      More than 50% of the time was spent counseling/coordinating care    Consulting clinician was informed of the encounter and consult note.    Consultation ended: 10/27/2022 at 1142    Sukhi Dennis MD   Psychiatry  [facility]

## 2022-10-28 NOTE — ED NOTES
Encouraged patient to give urine at this time. Patient educated on need for urine and verbalized understanding.

## 2022-10-28 NOTE — ED NOTES
[facility] arrives to transport patient to [facility]. Patient ambulatory to vehicle. Enedelia at [facility] updated on patients transportation.

## 2022-11-29 ENCOUNTER — HOSPITAL ENCOUNTER (EMERGENCY)
Facility: HOSPITAL | Age: 67
Discharge: PSYCHIATRIC OR SUBSTANCE USE FACILITY | End: 2022-11-29
Attending: EMERGENCY MEDICINE
Payer: MEDICARE

## 2022-11-29 VITALS
RESPIRATION RATE: 16 BRPM | BODY MASS INDEX: 26.68 KG/M2 | SYSTOLIC BLOOD PRESSURE: 119 MMHG | HEART RATE: 85 BPM | TEMPERATURE: 98 F | WEIGHT: 170 LBS | OXYGEN SATURATION: 95 % | HEIGHT: 67 IN | DIASTOLIC BLOOD PRESSURE: 67 MMHG

## 2022-11-29 DIAGNOSIS — R41.89 DISORGANIZED THOUGHT PROCESS: ICD-10-CM

## 2022-11-29 DIAGNOSIS — Z00.8 MEDICAL CLEARANCE FOR PSYCHIATRIC ADMISSION: ICD-10-CM

## 2022-11-29 DIAGNOSIS — F22 DELUSION: Primary | ICD-10-CM

## 2022-11-29 LAB
ALBUMIN SERPL BCP-MCNC: 4 G/DL (ref 3.5–5.2)
ALP SERPL-CCNC: 80 U/L (ref 55–135)
ALT SERPL W/O P-5'-P-CCNC: 16 U/L (ref 10–44)
AMPHET+METHAMPHET UR QL: ABNORMAL
ANION GAP SERPL CALC-SCNC: 7 MMOL/L (ref 8–16)
APAP SERPL-MCNC: <3 UG/ML (ref 10–20)
AST SERPL-CCNC: 20 U/L (ref 10–40)
BARBITURATES UR QL SCN>200 NG/ML: NEGATIVE
BASOPHILS # BLD AUTO: 0.02 K/UL (ref 0–0.2)
BASOPHILS NFR BLD: 0.3 % (ref 0–1.9)
BENZODIAZ UR QL SCN>200 NG/ML: NEGATIVE
BILIRUB SERPL-MCNC: 1.1 MG/DL (ref 0.1–1)
BILIRUB UR QL STRIP: NEGATIVE
BUN SERPL-MCNC: 18 MG/DL (ref 8–23)
BZE UR QL SCN: NEGATIVE
CALCIUM SERPL-MCNC: 9.4 MG/DL (ref 8.7–10.5)
CANNABINOIDS UR QL SCN: NEGATIVE
CHLORIDE SERPL-SCNC: 102 MMOL/L (ref 95–110)
CLARITY UR: CLEAR
CO2 SERPL-SCNC: 29 MMOL/L (ref 23–29)
COLOR UR: YELLOW
CREAT SERPL-MCNC: 1.2 MG/DL (ref 0.5–1.4)
CREAT UR-MCNC: 64.2 MG/DL (ref 23–375)
CTP QC/QA: YES
DIFFERENTIAL METHOD: ABNORMAL
EOSINOPHIL # BLD AUTO: 0.1 K/UL (ref 0–0.5)
EOSINOPHIL NFR BLD: 2.1 % (ref 0–8)
ERYTHROCYTE [DISTWIDTH] IN BLOOD BY AUTOMATED COUNT: 12.4 % (ref 11.5–14.5)
EST. GFR  (NO RACE VARIABLE): >60 ML/MIN/1.73 M^2
ETHANOL SERPL-MCNC: <10 MG/DL
GLUCOSE SERPL-MCNC: 105 MG/DL (ref 70–110)
GLUCOSE UR QL STRIP: NEGATIVE
HCT VFR BLD AUTO: 36.2 % (ref 40–54)
HGB BLD-MCNC: 11.9 G/DL (ref 14–18)
HGB UR QL STRIP: NEGATIVE
IMM GRANULOCYTES # BLD AUTO: 0.01 K/UL (ref 0–0.04)
IMM GRANULOCYTES NFR BLD AUTO: 0.2 % (ref 0–0.5)
KETONES UR QL STRIP: NEGATIVE
LEUKOCYTE ESTERASE UR QL STRIP: NEGATIVE
LYMPHOCYTES # BLD AUTO: 1.7 K/UL (ref 1–4.8)
LYMPHOCYTES NFR BLD: 26 % (ref 18–48)
MCH RBC QN AUTO: 29.8 PG (ref 27–31)
MCHC RBC AUTO-ENTMCNC: 32.9 G/DL (ref 32–36)
MCV RBC AUTO: 91 FL (ref 82–98)
METHADONE UR QL SCN>300 NG/ML: NEGATIVE
MONOCYTES # BLD AUTO: 0.9 K/UL (ref 0.3–1)
MONOCYTES NFR BLD: 13.1 % (ref 4–15)
NEUTROPHILS # BLD AUTO: 3.8 K/UL (ref 1.8–7.7)
NEUTROPHILS NFR BLD: 58.3 % (ref 38–73)
NITRITE UR QL STRIP: NEGATIVE
NRBC BLD-RTO: 0 /100 WBC
OPIATES UR QL SCN: NEGATIVE
PCP UR QL SCN>25 NG/ML: NEGATIVE
PH UR STRIP: 6 [PH] (ref 5–8)
PLATELET # BLD AUTO: 229 K/UL (ref 150–450)
PMV BLD AUTO: 11.5 FL (ref 9.2–12.9)
POTASSIUM SERPL-SCNC: 3.9 MMOL/L (ref 3.5–5.1)
PROT SERPL-MCNC: 6.9 G/DL (ref 6–8.4)
PROT UR QL STRIP: NEGATIVE
RBC # BLD AUTO: 4 M/UL (ref 4.6–6.2)
SARS-COV-2 RDRP RESP QL NAA+PROBE: NEGATIVE
SODIUM SERPL-SCNC: 138 MMOL/L (ref 136–145)
SP GR UR STRIP: 1.01 (ref 1–1.03)
T4 FREE SERPL-MCNC: 0.91 NG/DL (ref 0.71–1.51)
TOXICOLOGY INFORMATION: ABNORMAL
TSH SERPL DL<=0.005 MIU/L-ACNC: 7.89 UIU/ML (ref 0.4–4)
URN SPEC COLLECT METH UR: NORMAL
UROBILINOGEN UR STRIP-ACNC: NEGATIVE EU/DL
WBC # BLD AUTO: 6.55 K/UL (ref 3.9–12.7)

## 2022-11-29 PROCEDURE — 81003 URINALYSIS AUTO W/O SCOPE: CPT | Mod: 59 | Performed by: EMERGENCY MEDICINE

## 2022-11-29 PROCEDURE — 93005 ELECTROCARDIOGRAM TRACING: CPT

## 2022-11-29 PROCEDURE — 93010 ELECTROCARDIOGRAM REPORT: CPT | Mod: ,,, | Performed by: INTERNAL MEDICINE

## 2022-11-29 PROCEDURE — 80053 COMPREHEN METABOLIC PANEL: CPT | Performed by: EMERGENCY MEDICINE

## 2022-11-29 PROCEDURE — 84439 ASSAY OF FREE THYROXINE: CPT | Performed by: EMERGENCY MEDICINE

## 2022-11-29 PROCEDURE — 84443 ASSAY THYROID STIM HORMONE: CPT | Performed by: EMERGENCY MEDICINE

## 2022-11-29 PROCEDURE — G0425 PR INPT TELEHEALTH CONSULT 30M: ICD-10-PCS | Mod: 95,,, | Performed by: PSYCHIATRY & NEUROLOGY

## 2022-11-29 PROCEDURE — 87635 SARS-COV-2 COVID-19 AMP PRB: CPT | Performed by: EMERGENCY MEDICINE

## 2022-11-29 PROCEDURE — 82077 ASSAY SPEC XCP UR&BREATH IA: CPT | Performed by: EMERGENCY MEDICINE

## 2022-11-29 PROCEDURE — 93010 EKG 12-LEAD: ICD-10-PCS | Mod: ,,, | Performed by: INTERNAL MEDICINE

## 2022-11-29 PROCEDURE — 99285 EMERGENCY DEPT VISIT HI MDM: CPT

## 2022-11-29 PROCEDURE — G0425 INPT/ED TELECONSULT30: HCPCS | Mod: 95,,, | Performed by: PSYCHIATRY & NEUROLOGY

## 2022-11-29 PROCEDURE — 80307 DRUG TEST PRSMV CHEM ANLYZR: CPT | Performed by: EMERGENCY MEDICINE

## 2022-11-29 PROCEDURE — 80143 DRUG ASSAY ACETAMINOPHEN: CPT | Performed by: EMERGENCY MEDICINE

## 2022-11-29 PROCEDURE — 85025 COMPLETE CBC W/AUTO DIFF WBC: CPT | Performed by: EMERGENCY MEDICINE
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
## 2022-11-30 NOTE — ED NOTES
Pt notified of placement at [facility], understanding verbalized; per pt request, will notify pt's wife of transfer

## 2022-11-30 NOTE — ED NOTES
Report received from MAX Damon; pt resting calmly in bed with ED sitter at bedside; pt medically cleared and awaiting psych placement; no distress noted; will continue to monitor

## 2022-11-30 NOTE — ED NOTES
[facility] arrived to transport pt to [facility]; pt initially hesitant to leave but was redirectable; pt currently calm and cooperative leaving the ED at this time

## 2022-11-30 NOTE — ED NOTES
Per transfer center, patient accepted at [facility] to Dr. SIBLEY. Report to be called to [phone]. [facility] arranged transport with [facility], aprox ETA is 10:30pm tonight

## 2022-12-01 PROBLEM — J44.9 CHRONIC OBSTRUCTIVE PULMONARY DISEASE: Status: ACTIVE | Noted: 2018-10-24

## 2022-12-01 PROBLEM — F29 PSYCHOSIS: Status: ACTIVE | Noted: 2022-12-01

## 2022-12-01 PROBLEM — F10.10 ALCOHOL ABUSE: Status: ACTIVE | Noted: 2018-11-08

## 2022-12-01 PROBLEM — F17.210 CIGARETTE NICOTINE DEPENDENCE WITHOUT COMPLICATION: Status: ACTIVE | Noted: 2022-08-31

## 2022-12-01 PROBLEM — E03.9 ACQUIRED HYPOTHYROIDISM: Status: ACTIVE | Noted: 2019-06-03

## 2022-12-01 PROBLEM — I35.9 AORTIC VALVE CALCIFICATION: Status: ACTIVE | Noted: 2022-08-31

## 2022-12-01 PROBLEM — F13.20 BENZODIAZEPINE DEPENDENCE: Status: ACTIVE | Noted: 2022-08-23

## 2022-12-01 PROBLEM — B18.2 CHRONIC HEPATITIS C WITHOUT HEPATIC COMA: Status: ACTIVE | Noted: 2022-09-02

## 2022-12-07 ENCOUNTER — HOSPITAL ENCOUNTER (EMERGENCY)
Facility: HOSPITAL | Age: 67
Discharge: HOME | End: 2022-12-07
Attending: EMERGENCY MEDICINE
Payer: MEDICARE

## 2022-12-07 VITALS
TEMPERATURE: 98 F | OXYGEN SATURATION: 96 % | HEART RATE: 63 BPM | HEIGHT: 67 IN | DIASTOLIC BLOOD PRESSURE: 71 MMHG | WEIGHT: 183 LBS | RESPIRATION RATE: 16 BRPM | BODY MASS INDEX: 28.72 KG/M2 | SYSTOLIC BLOOD PRESSURE: 109 MMHG

## 2022-12-07 DIAGNOSIS — L03.115 BILATERAL LOWER LEG CELLULITIS: ICD-10-CM

## 2022-12-07 DIAGNOSIS — L03.116 BILATERAL LOWER LEG CELLULITIS: ICD-10-CM

## 2022-12-07 DIAGNOSIS — R60.0 LOWER LEG EDEMA: Primary | ICD-10-CM

## 2022-12-07 LAB
ALBUMIN SERPL BCP-MCNC: 3.6 G/DL (ref 3.5–5.2)
ALP SERPL-CCNC: 79 U/L (ref 55–135)
ALT SERPL W/O P-5'-P-CCNC: 11 U/L (ref 10–44)
ANION GAP SERPL CALC-SCNC: 6 MMOL/L (ref 8–16)
AST SERPL-CCNC: 18 U/L (ref 10–40)
BASOPHILS # BLD AUTO: 0.03 K/UL (ref 0–0.2)
BASOPHILS NFR BLD: 0.4 % (ref 0–1.9)
BILIRUB SERPL-MCNC: 0.3 MG/DL (ref 0.1–1)
BUN SERPL-MCNC: 13 MG/DL (ref 8–23)
CALCIUM SERPL-MCNC: 9 MG/DL (ref 8.7–10.5)
CHLORIDE SERPL-SCNC: 106 MMOL/L (ref 95–110)
CO2 SERPL-SCNC: 27 MMOL/L (ref 23–29)
CREAT SERPL-MCNC: 0.7 MG/DL (ref 0.5–1.4)
DIFFERENTIAL METHOD: ABNORMAL
EOSINOPHIL # BLD AUTO: 0.2 K/UL (ref 0–0.5)
EOSINOPHIL NFR BLD: 3.2 % (ref 0–8)
ERYTHROCYTE [DISTWIDTH] IN BLOOD BY AUTOMATED COUNT: 12.1 % (ref 11.5–14.5)
EST. GFR  (NO RACE VARIABLE): >60 ML/MIN/1.73 M^2
GLUCOSE SERPL-MCNC: 104 MG/DL (ref 70–110)
HCT VFR BLD AUTO: 33.8 % (ref 40–54)
HGB BLD-MCNC: 11.2 G/DL (ref 14–18)
IMM GRANULOCYTES # BLD AUTO: 0.02 K/UL (ref 0–0.04)
IMM GRANULOCYTES NFR BLD AUTO: 0.3 % (ref 0–0.5)
LYMPHOCYTES # BLD AUTO: 2.5 K/UL (ref 1–4.8)
LYMPHOCYTES NFR BLD: 34.4 % (ref 18–48)
MCH RBC QN AUTO: 30.3 PG (ref 27–31)
MCHC RBC AUTO-ENTMCNC: 33.1 G/DL (ref 32–36)
MCV RBC AUTO: 91 FL (ref 82–98)
MONOCYTES # BLD AUTO: 0.9 K/UL (ref 0.3–1)
MONOCYTES NFR BLD: 12.2 % (ref 4–15)
NEUTROPHILS # BLD AUTO: 3.6 K/UL (ref 1.8–7.7)
NEUTROPHILS NFR BLD: 49.5 % (ref 38–73)
NRBC BLD-RTO: 0 /100 WBC
PLATELET # BLD AUTO: 204 K/UL (ref 150–450)
PMV BLD AUTO: 11.2 FL (ref 9.2–12.9)
POTASSIUM SERPL-SCNC: 3.8 MMOL/L (ref 3.5–5.1)
PROT SERPL-MCNC: 6.3 G/DL (ref 6–8.4)
RBC # BLD AUTO: 3.7 M/UL (ref 4.6–6.2)
SODIUM SERPL-SCNC: 139 MMOL/L (ref 136–145)
WBC # BLD AUTO: 7.27 K/UL (ref 3.9–12.7)

## 2022-12-07 PROCEDURE — 85025 COMPLETE CBC W/AUTO DIFF WBC: CPT | Performed by: EMERGENCY MEDICINE

## 2022-12-07 PROCEDURE — 93005 ELECTROCARDIOGRAM TRACING: CPT

## 2022-12-07 PROCEDURE — 99285 EMERGENCY DEPT VISIT HI MDM: CPT | Mod: 25

## 2022-12-07 PROCEDURE — 80053 COMPREHEN METABOLIC PANEL: CPT | Performed by: EMERGENCY MEDICINE

## 2022-12-07 PROCEDURE — 93010 EKG 12-LEAD: ICD-10-PCS | Mod: ,,, | Performed by: INTERNAL MEDICINE

## 2022-12-07 PROCEDURE — 93010 ELECTROCARDIOGRAM REPORT: CPT | Mod: ,,, | Performed by: INTERNAL MEDICINE

## 2022-12-07 RX ORDER — SULFAMETHOXAZOLE AND TRIMETHOPRIM 800; 160 MG/1; MG/1
1 TABLET ORAL 2 TIMES DAILY
Qty: 14 TABLET | Refills: 0 | Status: SHIPPED | OUTPATIENT
Start: 2022-12-07 | End: 2022-12-14

## 2022-12-07 NOTE — ED PROVIDER NOTES
Encounter Date: 12/7/2022       History     Chief Complaint   Patient presents with    feet pain     Bilateral feet pain with edema after walking miles to get home yesterday. Per EMS possible drug use.      67-year-old male presents with bilateral lower extremity edema and lower leg pain.  History of alcohol dependence, hypertension.  Patient reports he has had leg swelling for roughly 1 year however the last few days he has noted back pain.  Patient reports he drinks regularly multiple drinks per day.  Denies any injuries to the legs.  Reports pain present in bilateral shins, constant, not worse with palpation or movement.  Denies any fevers.    Review of patient's allergies indicates:  No Known Allergies  Past Medical History:   Diagnosis Date    Arthritis     COPD (chronic obstructive pulmonary disease)     Hypertension     Prostate disorder     Thyroid disease      Past Surgical History:   Procedure Laterality Date    HAND SURGERY      left hand     Family History   Problem Relation Age of Onset    Cancer Mother     Cancer Father     Heart disease Father     Cancer Sister      Social History     Tobacco Use    Smoking status: Every Day     Packs/day: 1.50     Types: Cigarettes    Smokeless tobacco: Never   Substance Use Topics    Alcohol use: Yes     Alcohol/week: 4.0 standard drinks     Types: 4 Cans of beer per week     Comment: 8-10 beers a day    Drug use: Yes     Frequency: 7.0 times per week     Types: Marijuana     Review of Systems   Constitutional:  Negative for fever.   HENT:  Negative for congestion.    Respiratory:  Negative for cough and shortness of breath.    Cardiovascular:  Positive for leg swelling. Negative for chest pain.   Gastrointestinal:  Negative for abdominal pain, diarrhea and nausea.   Genitourinary:  Negative for dysuria.   Musculoskeletal:  Negative for back pain.        Reported leg pain.   Skin:  Negative for rash.   Neurological:  Negative for headaches.   Psychiatric/Behavioral:   Negative for confusion.      Physical Exam     Initial Vitals [12/07/22 0411]   BP Pulse Resp Temp SpO2   118/66 70 18 98.2 °F (36.8 °C) 96 %      MAP       --         Physical Exam    Constitutional: He appears well-developed. He is not diaphoretic. He does not appear ill.   Appears intoxicated   HENT:   Head: Normocephalic.   Eyes: EOM are normal.   Cardiovascular:  Normal rate.           No murmur heard.  Pulmonary/Chest: Breath sounds normal. He has no wheezes.   Abdominal: Abdomen is soft. He exhibits no distension. There is no abdominal tenderness.   Musculoskeletal:         General: Normal range of motion.      Comments: Bilateral lower extremity pitting edema.     Neurological: He is alert.   Skin: Skin is warm.   Erythema present over anterior shins bilaterally.       ED Course   Procedures  Labs Reviewed   CBC W/ AUTO DIFFERENTIAL - Abnormal; Notable for the following components:       Result Value    RBC 3.70 (*)     Hemoglobin 11.2 (*)     Hematocrit 33.8 (*)     All other components within normal limits   COMPREHENSIVE METABOLIC PANEL - Abnormal; Notable for the following components:    Anion Gap 6 (*)     All other components within normal limits          Imaging Results              X-Ray Chest AP Portable (Preliminary result)  Result time 12/07/22 06:14:33      ED Interpretation by Keith Moran MD (12/07/22 06:14:33, [facility] - Emergency Dept, Emergency Medicine)    No cardiomegaly noted.  No infiltrates or consolidation noted.                                     Medications - No data to display  Medical Decision Making:   Initial Assessment:   67-year-old male presenting with bilateral lower extremity pain.  Patient reporting pain is constant.  Areas of erythema.  No crepitus noted.  No calf tenderness.  Non ill-appearing.  Suspect bilateral lower extremity cellulitis secondary to edema.  No cardiomegaly on chest x-ray.  No LVH noted on ECG.  Patient is started on Bactrim.  Follow up with  primary care recommended.           ED Course as of 12/07/22 0615   Wed Dec 07, 2022   0500 EKG 12-lead  Time for: 42     Rate 63, sinus, regular rhythm, normal axis.    .  No ST elevation or depression.  No T-wave inversion no Q-waves present.    Normal sinus rhythm.   [JM]      ED Course User Index  [JM] Keith Moran MD                 Clinical Impression:   Final diagnoses:  [R60.0] Lower leg edema (Primary)  [L03.116, L03.115] Bilateral lower leg cellulitis        ED Disposition Condition    Discharge Stable          ED Prescriptions       Medication Sig Dispense Start Date End Date Auth. Provider    sulfamethoxazole-trimethoprim 800-160mg (BACTRIM DS) 800-160 mg Tab Take 1 tablet by mouth 2 (two) times daily. for 7 days 14 tablet 12/7/2022 12/14/2022 Keith Moran MD          Follow-up Information       Follow up With Specialties Details Why Contact Info    Heraclio Sims MD Internal Medicine Schedule an appointment as soon as possible for a visit in 2 days Primary care [address] [facility] [address]  [phone]      [facility] Emergency Medicine  If symptoms worsen [address]  [facility] [address] [phone]             Keith Moran MD  12/07/22 0615

## 2022-12-07 NOTE — ED NOTES
Pt presents with ble edema and redness after walking yesterday, sedated, appears intoxicated, admits to ETOH pta.  Noncompliant with rx    Patient identifiers verified by spelling and stated name on armband along with .     Review of patient's allergies indicates:  Review of patient's allergies indicates:  No Known Allergies      NEURO: lethargic, arouses to voice, Ox4, GCS-14.   No neuro deficits noted.   CARDIAC: NRR. Denies CP.   PERIPHERAL VASCULAR: Peripheral pulses present. Cap refill < 3 seconds x4. Edema and redness noted to ble. Warm to touch.    RESPIRATORY:Respirations are even and unlabored with regular rate and effort, No use of accessory muscles, denies SOB.       Patient updated on plan of care and changed into hospital gown.  Placed on continuous CM/SpO2/NIBP.  Bed locked and in low position with side rails up x2, call light within reach.     Will continue to monitor.

## 2022-12-07 NOTE — DISCHARGE INSTRUCTIONS
Recommend keeping legs elevated to improve leg swelling.  You are receiving a new antibiotic prescription of Bactrim for lower leg cellulitis.  Follow up with your primary care provider for further management of symptoms.

## 2023-07-06 ENCOUNTER — HOSPITAL ENCOUNTER (EMERGENCY)
Facility: HOSPITAL | Age: 68
Discharge: HOME | End: 2023-07-06
Attending: EMERGENCY MEDICINE
Payer: MEDICARE

## 2023-07-06 VITALS
SYSTOLIC BLOOD PRESSURE: 120 MMHG | HEART RATE: 92 BPM | DIASTOLIC BLOOD PRESSURE: 83 MMHG | TEMPERATURE: 99 F | OXYGEN SATURATION: 97 % | RESPIRATION RATE: 15 BRPM

## 2023-07-06 DIAGNOSIS — S29.9XXA CHEST WALL INJURY: ICD-10-CM

## 2023-07-06 DIAGNOSIS — S99.929A FOOT INJURY: ICD-10-CM

## 2023-07-06 DIAGNOSIS — S01.81XA FACIAL LACERATION, INITIAL ENCOUNTER: ICD-10-CM

## 2023-07-06 DIAGNOSIS — S20.212A CONTUSION OF RIB ON LEFT SIDE, INITIAL ENCOUNTER: ICD-10-CM

## 2023-07-06 DIAGNOSIS — S09.90XA CLOSED HEAD INJURY, INITIAL ENCOUNTER: ICD-10-CM

## 2023-07-06 DIAGNOSIS — S93.602A FOOT SPRAIN, LEFT, INITIAL ENCOUNTER: Primary | ICD-10-CM

## 2023-07-06 DIAGNOSIS — S29.9XXA RIB INJURY: ICD-10-CM

## 2023-07-06 PROCEDURE — 25000003 PHARM REV CODE 250: Performed by: EMERGENCY MEDICINE

## 2023-07-06 PROCEDURE — 99285 EMERGENCY DEPT VISIT HI MDM: CPT | Mod: 25

## 2023-07-06 PROCEDURE — 12052 INTMD RPR FACE/MM 2.6-5.0 CM: CPT

## 2023-07-06 RX ORDER — LIDOCAINE HYDROCHLORIDE AND EPINEPHRINE 10; 10 MG/ML; UG/ML
10 INJECTION, SOLUTION INFILTRATION; PERINEURAL
Status: COMPLETED | OUTPATIENT
Start: 2023-07-06 | End: 2023-07-06

## 2023-07-06 RX ORDER — LIDOCAINE 50 MG/G
1 PATCH TOPICAL DAILY
Qty: 20 PATCH | Refills: 0 | Status: SHIPPED | OUTPATIENT
Start: 2023-07-06 | End: 2023-12-08

## 2023-07-06 RX ORDER — LIDOCAINE HYDROCHLORIDE 10 MG/ML
10 INJECTION INFILTRATION; PERINEURAL
Status: COMPLETED | OUTPATIENT
Start: 2023-07-06 | End: 2023-07-06

## 2023-07-06 RX ORDER — METHOCARBAMOL 500 MG/1
500 TABLET, FILM COATED ORAL 3 TIMES DAILY PRN
Qty: 30 TABLET | Refills: 0 | Status: SHIPPED | OUTPATIENT
Start: 2023-07-06 | End: 2023-07-11

## 2023-07-06 RX ADMIN — LIDOCAINE HYDROCHLORIDE 10 ML: 10 INJECTION, SOLUTION INFILTRATION; PERINEURAL at 07:07

## 2023-07-06 RX ADMIN — LIDOCAINE HYDROCHLORIDE,EPINEPHRINE BITARTRATE 10 ML: 10; .01 INJECTION, SOLUTION INFILTRATION; PERINEURAL at 07:07

## 2023-07-06 RX ADMIN — NEOMYCIN AND POLYMYXIN B SULFATES AND BACITRACIN ZINC 1 EACH: 400; 3.5; 5 OINTMENT TOPICAL at 08:07

## 2023-07-06 NOTE — DISCHARGE INSTRUCTIONS
Please keep wound clean.  Apply antibiotic ointment twice daily to wound.  Return if you get worse or if new symptoms develop such as signs of infection such as purulent drainage, redness, fever to the area.  Stitches will dissolve eventually.  This may take several weeks.  Please use incentive spirometer and lidocaine patches on methocarbamol for chest wall bruise.  Ace wrap for comfort with elevation and ice for foot.
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
## 2023-07-06 NOTE — ED NOTES
"During Pt education on medications, Pt stated "you can keep those prescriptions. I need opioids, not a lot but at least for a few days". Pt was told he would not be getting opioids per MD.  "
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
## 2023-08-10 ENCOUNTER — HOSPITAL ENCOUNTER (EMERGENCY)
Facility: HOSPITAL | Age: 68
Discharge: HOME | End: 2023-08-10
Attending: EMERGENCY MEDICINE
Payer: MEDICARE

## 2023-08-10 VITALS
BODY MASS INDEX: 28.25 KG/M2 | OXYGEN SATURATION: 98 % | DIASTOLIC BLOOD PRESSURE: 82 MMHG | TEMPERATURE: 98 F | HEIGHT: 67 IN | HEART RATE: 78 BPM | WEIGHT: 180 LBS | RESPIRATION RATE: 16 BRPM | SYSTOLIC BLOOD PRESSURE: 138 MMHG

## 2023-08-10 DIAGNOSIS — G62.9 PERIPHERAL POLYNEUROPATHY: Primary | ICD-10-CM

## 2023-08-10 DIAGNOSIS — Z78.9 ALCOHOL CONSUMPTION HEAVY: ICD-10-CM

## 2023-08-10 DIAGNOSIS — M79.676 PAIN OF TOE, UNSPECIFIED LATERALITY: ICD-10-CM

## 2023-08-10 LAB
ALBUMIN SERPL BCP-MCNC: 4.1 G/DL (ref 3.5–5.2)
ALP SERPL-CCNC: 101 U/L (ref 55–135)
ALT SERPL W/O P-5'-P-CCNC: 12 U/L (ref 10–44)
AMPHET+METHAMPHET UR QL: NEGATIVE
ANION GAP SERPL CALC-SCNC: 7 MMOL/L (ref 8–16)
APAP SERPL-MCNC: <3 UG/ML (ref 10–20)
AST SERPL-CCNC: 13 U/L (ref 10–40)
BARBITURATES UR QL SCN>200 NG/ML: NEGATIVE
BASOPHILS # BLD AUTO: 0.03 K/UL (ref 0–0.2)
BASOPHILS NFR BLD: 0.4 % (ref 0–1.9)
BENZODIAZ UR QL SCN>200 NG/ML: NEGATIVE
BILIRUB SERPL-MCNC: 0.6 MG/DL (ref 0.1–1)
BILIRUB UR QL STRIP: NEGATIVE
BUN SERPL-MCNC: 9 MG/DL (ref 8–23)
BZE UR QL SCN: NEGATIVE
CALCIUM SERPL-MCNC: 9.1 MG/DL (ref 8.7–10.5)
CANNABINOIDS UR QL SCN: NEGATIVE
CHLORIDE SERPL-SCNC: 104 MMOL/L (ref 95–110)
CLARITY UR: CLEAR
CO2 SERPL-SCNC: 24 MMOL/L (ref 23–29)
COLOR UR: YELLOW
CREAT SERPL-MCNC: 0.7 MG/DL (ref 0.5–1.4)
CREAT UR-MCNC: 61.6 MG/DL (ref 23–375)
DIFFERENTIAL METHOD: NORMAL
EOSINOPHIL # BLD AUTO: 0.2 K/UL (ref 0–0.5)
EOSINOPHIL NFR BLD: 2.3 % (ref 0–8)
ERYTHROCYTE [DISTWIDTH] IN BLOOD BY AUTOMATED COUNT: 12.2 % (ref 11.5–14.5)
EST. GFR  (NO RACE VARIABLE): >60 ML/MIN/1.73 M^2
ETHANOL SERPL-MCNC: <10 MG/DL
GLUCOSE SERPL-MCNC: 99 MG/DL (ref 70–110)
GLUCOSE UR QL STRIP: NEGATIVE
HCT VFR BLD AUTO: 42.7 % (ref 40–54)
HGB BLD-MCNC: 14.5 G/DL (ref 14–18)
HGB UR QL STRIP: NEGATIVE
IMM GRANULOCYTES # BLD AUTO: 0.01 K/UL (ref 0–0.04)
IMM GRANULOCYTES NFR BLD AUTO: 0.1 % (ref 0–0.5)
KETONES UR QL STRIP: NEGATIVE
LEUKOCYTE ESTERASE UR QL STRIP: NEGATIVE
LYMPHOCYTES # BLD AUTO: 2.7 K/UL (ref 1–4.8)
LYMPHOCYTES NFR BLD: 37 % (ref 18–48)
MAGNESIUM SERPL-MCNC: 2 MG/DL (ref 1.6–2.6)
MCH RBC QN AUTO: 29.8 PG (ref 27–31)
MCHC RBC AUTO-ENTMCNC: 34 G/DL (ref 32–36)
MCV RBC AUTO: 88 FL (ref 82–98)
METHADONE UR QL SCN>300 NG/ML: NEGATIVE
MONOCYTES # BLD AUTO: 0.8 K/UL (ref 0.3–1)
MONOCYTES NFR BLD: 10.2 % (ref 4–15)
NEUTROPHILS # BLD AUTO: 3.7 K/UL (ref 1.8–7.7)
NEUTROPHILS NFR BLD: 50 % (ref 38–73)
NITRITE UR QL STRIP: NEGATIVE
NRBC BLD-RTO: 0 /100 WBC
OPIATES UR QL SCN: NEGATIVE
PCP UR QL SCN>25 NG/ML: NEGATIVE
PH UR STRIP: 6 [PH] (ref 5–8)
PLATELET # BLD AUTO: 208 K/UL (ref 150–450)
PMV BLD AUTO: 11.3 FL (ref 9.2–12.9)
POTASSIUM SERPL-SCNC: 4 MMOL/L (ref 3.5–5.1)
PROT SERPL-MCNC: 7.2 G/DL (ref 6–8.4)
PROT UR QL STRIP: NEGATIVE
RBC # BLD AUTO: 4.87 M/UL (ref 4.6–6.2)
SALICYLATES SERPL-MCNC: <5 MG/DL (ref 15–30)
SODIUM SERPL-SCNC: 135 MMOL/L (ref 136–145)
SP GR UR STRIP: 1.01 (ref 1–1.03)
T4 FREE SERPL-MCNC: 0.93 NG/DL (ref 0.71–1.51)
TOXICOLOGY INFORMATION: NORMAL
TSH SERPL DL<=0.005 MIU/L-ACNC: 8.87 UIU/ML (ref 0.4–4)
URN SPEC COLLECT METH UR: NORMAL
UROBILINOGEN UR STRIP-ACNC: NEGATIVE EU/DL
WBC # BLD AUTO: 7.35 K/UL (ref 3.9–12.7)

## 2023-08-10 PROCEDURE — 80053 COMPREHEN METABOLIC PANEL: CPT | Performed by: EMERGENCY MEDICINE

## 2023-08-10 PROCEDURE — 25000003 PHARM REV CODE 250: Performed by: EMERGENCY MEDICINE

## 2023-08-10 PROCEDURE — 99284 EMERGENCY DEPT VISIT MOD MDM: CPT

## 2023-08-10 PROCEDURE — 80307 DRUG TEST PRSMV CHEM ANLYZR: CPT | Performed by: EMERGENCY MEDICINE

## 2023-08-10 PROCEDURE — 82077 ASSAY SPEC XCP UR&BREATH IA: CPT | Performed by: EMERGENCY MEDICINE

## 2023-08-10 PROCEDURE — 81003 URINALYSIS AUTO W/O SCOPE: CPT | Mod: 59 | Performed by: EMERGENCY MEDICINE

## 2023-08-10 PROCEDURE — 80143 DRUG ASSAY ACETAMINOPHEN: CPT | Performed by: EMERGENCY MEDICINE

## 2023-08-10 PROCEDURE — 83735 ASSAY OF MAGNESIUM: CPT | Performed by: EMERGENCY MEDICINE

## 2023-08-10 PROCEDURE — 85025 COMPLETE CBC W/AUTO DIFF WBC: CPT | Performed by: EMERGENCY MEDICINE

## 2023-08-10 PROCEDURE — 84439 ASSAY OF FREE THYROXINE: CPT | Performed by: EMERGENCY MEDICINE

## 2023-08-10 PROCEDURE — 84443 ASSAY THYROID STIM HORMONE: CPT | Performed by: EMERGENCY MEDICINE

## 2023-08-10 PROCEDURE — 80179 DRUG ASSAY SALICYLATE: CPT | Performed by: EMERGENCY MEDICINE

## 2023-08-10 RX ORDER — GABAPENTIN 300 MG/1
300 CAPSULE ORAL
Status: COMPLETED | OUTPATIENT
Start: 2023-08-10 | End: 2023-08-10

## 2023-08-10 RX ORDER — GABAPENTIN 100 MG/1
100 CAPSULE ORAL 3 TIMES DAILY
Qty: 45 CAPSULE | Refills: 0 | Status: SHIPPED | OUTPATIENT
Start: 2023-08-10 | End: 2023-12-08

## 2023-08-10 RX ORDER — DIAZEPAM 5 MG/1
5 TABLET ORAL
Status: COMPLETED | OUTPATIENT
Start: 2023-08-10 | End: 2023-08-10

## 2023-08-10 RX ADMIN — GABAPENTIN 300 MG: 300 CAPSULE ORAL at 10:08

## 2023-08-10 RX ADMIN — DIAZEPAM 5 MG: 5 TABLET ORAL at 10:08

## 2023-08-10 NOTE — ED PROVIDER NOTES
"Encounter Date: 8/10/2023    SCRIBE #1 NOTE: I, Millie Modesta, am scribing for, and in the presence of,  Freddy Wan MD. I have scribed the following portions of the note - Other sections scribed: HPI, ROS, PE, MDM.     History     Chief Complaint   Patient presents with    Leg Pain     Pt reports bilateral leg, feet, and L arm pain x 7 days. States, "my bones are hurting." Reports bilaterally blurry vision x7 days. Reports being a daily drinker. States usually drinks 3-4 16oz beers a day. States last drink was last night. Reports slight HA and nausea, denies emesis. Reports feeling anxious.      This 67 y.o male, with a medical history of Arthritis, COPD, Hypertension, Prostate disorder, and Thyroid disease, presents to the ED c/o acute bilateral burning leg pain for the last 7 days. Pt reports also experiencing pain to the bottom of the bilateral feet and to the bilateral arms. He describes the pain as "burning," constant and moderate (7/10). He notes also experiencing bilateral blurred vision (for 7x days) and nausea. He states that he typically consumes 3-4 beers daily with his last drink being consumed at 5:30 pm yesterday. Pt notes that he also takes B1 vitamins daily and is concerned as he he recently read that he should not consume large amounts of alcohol along with the vitamin. Pt is a tobacco smoker. No recent lifestyle changes. He denies rhinorrhea, congestion, emesis, rashes, lesions or any other associated symptoms. No alleviating factors. No known medication allergies.    Pt has a surgical history that includes Hand surgery.     The history is provided by the patient.     Review of patient's allergies indicates:  No Known Allergies  Past Medical History:   Diagnosis Date    Arthritis     COPD (chronic obstructive pulmonary disease)     Hypertension     Prostate disorder     Thyroid disease      Past Surgical History:   Procedure Laterality Date    HAND SURGERY      left hand     Family " History   Problem Relation Age of Onset    Cancer Mother     Cancer Father     Heart disease Father     Cancer Sister      Social History     Tobacco Use    Smoking status: Every Day     Current packs/day: 1.50     Types: Cigarettes    Smokeless tobacco: Never   Substance Use Topics    Alcohol use: Yes     Alcohol/week: 4.0 standard drinks of alcohol     Types: 4 Cans of beer per week     Comment: 8-10 beers a day    Drug use: Yes     Frequency: 7.0 times per week     Types: Marijuana, Methamphetamines     Comment: lasy use 3 months ago     Review of Systems   Constitutional:  Negative for fever.   HENT:  Negative for sore throat.    Eyes:  Positive for visual disturbance (bilateral blurred vision).   Respiratory:  Negative for shortness of breath.    Cardiovascular:  Negative for chest pain.   Gastrointestinal:  Positive for nausea.   Genitourinary:  Negative for dysuria.   Musculoskeletal:  Negative for back pain.        (+) bilateral leg pain; (+) pain to bottom of bilateral feet; (+) left arm pain   Skin:  Negative for rash.   Neurological:  Negative for weakness.   All other systems reviewed and are negative.      Physical Exam     Initial Vitals [08/10/23 0945]   BP Pulse Resp Temp SpO2   (!) 136/95 88 16 98.1 °F (36.7 °C) 97 %      MAP       --         Physical Exam    Nursing note and vitals reviewed.  Constitutional: He appears well-developed and well-nourished.   HENT:   Head: Normocephalic and atraumatic.   Mouth/Throat: Mucous membranes are normal.   Patent   Eyes: Conjunctivae and EOM are normal. Pupils are equal, round, and reactive to light. Right conjunctiva is not injected. Left conjunctiva is not injected.   sclera anicteric   Neck: Neck supple.    Full passive range of motion without pain.     Cardiovascular:  Regular rhythm, S1 normal, S2 normal and normal heart sounds.     Exam reveals no gallop and no friction rub.       No murmur heard.  Pulses:       Radial pulses are 2+ on the right  side and 2+ on the left side.   Pulmonary/Chest: Effort normal and breath sounds normal. No respiratory distress.   Abdominal: Abdomen is soft. He exhibits no distension. There is no abdominal tenderness.   Musculoskeletal:      Cervical back: Full passive range of motion without pain and neck supple.      Comments: Good active ROM of all extremities. No lower extremity edema or cyanosis.     Neurological: He is alert. No cranial nerve deficit or sensory deficit. Gait normal.   Mild tremors present.   Skin: Skin is warm. No ecchymosis and no rash noted.   Psychiatric: Thought content normal.   He seems anxious.        ED Course   Procedures  Labs Reviewed   COMPREHENSIVE METABOLIC PANEL - Abnormal; Notable for the following components:       Result Value    Sodium 135 (*)     Anion Gap 7 (*)     All other components within normal limits   TSH - Abnormal; Notable for the following components:    TSH 8.869 (*)     All other components within normal limits   SALICYLATE LEVEL - Abnormal; Notable for the following components:    Salicylate Lvl <5.0 (*)     All other components within normal limits   ACETAMINOPHEN LEVEL - Abnormal; Notable for the following components:    Acetaminophen (Tylenol), Serum <3.0 (*)     All other components within normal limits   CBC W/ AUTO DIFFERENTIAL   MAGNESIUM   URINALYSIS, REFLEX TO URINE CULTURE    Narrative:     Specimen Source->Urine   DRUG SCREEN PANEL, URINE EMERGENCY    Narrative:     Specimen Source->Urine   ALCOHOL,MEDICAL (ETHANOL)   T4, FREE     EKG Readings: (Independently Interpreted)   Rhythm: Normal Sinus Rhythm. Heart Rate: 75. Ectopy: No Ectopy. Conduction: Normal. ST Segments: Normal ST Segments. T Waves: Normal. Clinical Impression: Normal Sinus Rhythm       Imaging Results    None          Medications   diazePAM tablet 5 mg (5 mg Oral Given 8/10/23 1017)   gabapentin capsule 300 mg (300 mg Oral Given 8/10/23 1017)     Medical Decision Making:   History:   Old Medical  Records: I decided to obtain old medical records.  Old Records Summarized: other records.       <> Summary of Records: External records reviewed.  Initial Assessment:   67-year-old male presenting today secondary to what appears to be neuropathy of the hands and feet.  Patient does use alcohol on a regular basis.  This could be a deficiency and some type of vitamin.  Also taking B1.  Instructed patient please take 1 multivitamin and to not take too many of any particular vitamin.  Patient given medications above with marked improvement of symptoms.  Instructed patient please decrease his alcohol intake.  Vitals reassuring.  Patient states he feels markedly better.  Labs are reassuring.  No obvious signs of infection.  Lungs cleared.  Doubt pneumonia.  T4 is reassuring.  Discharged with outpatient follow-up. I discussed with the patient/family the diagnosis, treatment plan, indications for return to the emergency department, and for expected follow-up. The patient/family verbalized an understanding. The patient/family is asked if there are any questions or concerns. We discuss the case, until all issues are addressed to the patient/family's satisfaction. Patient/family understands and is agreeable to the plan.   Freddy Wan    DISCLAIMER: This note was prepared with [employer] voice recognition transcription software. Garbled syntax, mangled pronouns, and other bizarre constructions may be attributed to that software system.    Independently Interpreted Test(s):   I have ordered and independently interpreted EKG Reading(s) - see summary below       <> Summary of EKG Reading(s): EKG independently interpreted by me: see above.    Clinical Tests:   Lab Tests: Ordered and Reviewed  Medical Tests: Ordered and Reviewed          Scribe Attestation:   Scribe #1: I performed the above scribed service and the documentation accurately describes the services I performed. I attest to the accuracy of the note.                 I,  marleni mera, personally performed the services described in this documentation. All medical record entries made by the scribe were at my direction and in my presence. I have reviewed the chart and agree that the record reflects my personal performance and is accurate and complete.   Clinical Impression:   Final diagnoses:  [G62.9] Peripheral polyneuropathy (Primary)  [Z78.9] Alcohol consumption heavy  [M79.676] Pain of toe, unspecified laterality        ED Disposition Condition    Discharge Stable          ED Prescriptions       Medication Sig Dispense Start Date End Date Auth. Provider    gabapentin (NEURONTIN) 100 MG capsule Take 1 capsule (100 mg total) by mouth 3 (three) times daily. for 15 days 45 capsule 8/10/2023 8/25/2023 Freddy Wan MD          Follow-up Information       Follow up With Specialties Details Why Contact Info    Heraclio Sims MD Internal Medicine Schedule an appointment as soon as possible for a visit in 2 days  [address]  [facility] [address]  [phone]      Troy Guzman MD Neurology Schedule an appointment as soon as possible for a visit in 2 days  [address]  [address]  [place] [address]  [phone]      Talita Adams DPM Podiatry, Wound Care Schedule an appointment as soon as possible for a visit in 2 days  [address] [facility] [address] [phone]               Freddy Wan MD  08/10/23 9849
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
## 2023-08-10 NOTE — DISCHARGE INSTRUCTIONS

## 2023-08-11 ENCOUNTER — PES CALL (OUTPATIENT)
Dept: ADMINISTRATIVE | Facility: CLINIC | Age: 68
End: 2023-08-11
Payer: MEDICARE

## 2023-12-07 ENCOUNTER — HOSPITAL ENCOUNTER (INPATIENT)
Facility: HOSPITAL | Age: 68
LOS: 1 days | Discharge: LEFT BEFORE DISCHARGE | DRG: 894 | End: 2023-12-09
Attending: STUDENT IN AN ORGANIZED HEALTH CARE EDUCATION/TRAINING PROGRAM | Admitting: STUDENT IN AN ORGANIZED HEALTH CARE EDUCATION/TRAINING PROGRAM
Payer: MEDICARE

## 2023-12-07 DIAGNOSIS — I63.9 CVA (CEREBRAL VASCULAR ACCIDENT): ICD-10-CM

## 2023-12-07 DIAGNOSIS — G93.40 ENCEPHALOPATHY ACUTE: Primary | ICD-10-CM

## 2023-12-07 DIAGNOSIS — R07.9 CHEST PAIN: ICD-10-CM

## 2023-12-07 DIAGNOSIS — F10.939 ALCOHOL WITHDRAWAL: ICD-10-CM

## 2023-12-07 LAB
ALBUMIN SERPL BCP-MCNC: 3.8 G/DL (ref 3.5–5.2)
ALP SERPL-CCNC: 82 U/L (ref 55–135)
ALT SERPL W/O P-5'-P-CCNC: 16 U/L (ref 10–44)
AMMONIA PLAS-SCNC: 42 UMOL/L (ref 10–50)
AMPHET+METHAMPHET UR QL: ABNORMAL
ANION GAP SERPL CALC-SCNC: 8 MMOL/L (ref 8–16)
APAP SERPL-MCNC: <3 UG/ML (ref 10–20)
AST SERPL-CCNC: 21 U/L (ref 10–40)
BARBITURATES UR QL SCN>200 NG/ML: NEGATIVE
BASOPHILS # BLD AUTO: 0.02 K/UL (ref 0–0.2)
BASOPHILS NFR BLD: 0.3 % (ref 0–1.9)
BENZODIAZ UR QL SCN>200 NG/ML: NEGATIVE
BILIRUB SERPL-MCNC: 0.5 MG/DL (ref 0.1–1)
BILIRUB UR QL STRIP: NEGATIVE
BNP SERPL-MCNC: 36 PG/ML (ref 0–99)
BUN SERPL-MCNC: 10 MG/DL (ref 8–23)
BZE UR QL SCN: NEGATIVE
CALCIUM SERPL-MCNC: 9 MG/DL (ref 8.7–10.5)
CANNABINOIDS UR QL SCN: ABNORMAL
CHLORIDE SERPL-SCNC: 103 MMOL/L (ref 95–110)
CLARITY UR: CLEAR
CO2 SERPL-SCNC: 28 MMOL/L (ref 23–29)
COLOR UR: YELLOW
CREAT SERPL-MCNC: 0.8 MG/DL (ref 0.5–1.4)
CREAT UR-MCNC: 53.9 MG/DL (ref 23–375)
DIFFERENTIAL METHOD: ABNORMAL
EOSINOPHIL # BLD AUTO: 0.2 K/UL (ref 0–0.5)
EOSINOPHIL NFR BLD: 3.1 % (ref 0–8)
ERYTHROCYTE [DISTWIDTH] IN BLOOD BY AUTOMATED COUNT: 12.3 % (ref 11.5–14.5)
EST. GFR  (NO RACE VARIABLE): >60 ML/MIN/1.73 M^2
ETHANOL SERPL-MCNC: <10 MG/DL
GLUCOSE SERPL-MCNC: 102 MG/DL (ref 70–110)
GLUCOSE UR QL STRIP: NEGATIVE
HCT VFR BLD AUTO: 35.6 % (ref 40–54)
HGB BLD-MCNC: 11.6 G/DL (ref 14–18)
HGB UR QL STRIP: NEGATIVE
IMM GRANULOCYTES # BLD AUTO: 0.01 K/UL (ref 0–0.04)
IMM GRANULOCYTES NFR BLD AUTO: 0.2 % (ref 0–0.5)
KETONES UR QL STRIP: NEGATIVE
LEUKOCYTE ESTERASE UR QL STRIP: NEGATIVE
LIPASE SERPL-CCNC: 13 U/L (ref 4–60)
LYMPHOCYTES # BLD AUTO: 2 K/UL (ref 1–4.8)
LYMPHOCYTES NFR BLD: 33.1 % (ref 18–48)
MAGNESIUM SERPL-MCNC: 2.3 MG/DL (ref 1.6–2.6)
MCH RBC QN AUTO: 29.6 PG (ref 27–31)
MCHC RBC AUTO-ENTMCNC: 32.6 G/DL (ref 32–36)
MCV RBC AUTO: 91 FL (ref 82–98)
METHADONE UR QL SCN>300 NG/ML: NEGATIVE
MONOCYTES # BLD AUTO: 0.9 K/UL (ref 0.3–1)
MONOCYTES NFR BLD: 14.6 % (ref 4–15)
NEUTROPHILS # BLD AUTO: 3 K/UL (ref 1.8–7.7)
NEUTROPHILS NFR BLD: 48.7 % (ref 38–73)
NITRITE UR QL STRIP: NEGATIVE
NRBC BLD-RTO: 0 /100 WBC
OPIATES UR QL SCN: NEGATIVE
PCP UR QL SCN>25 NG/ML: NEGATIVE
PH UR STRIP: 5 [PH] (ref 5–8)
PLATELET # BLD AUTO: 214 K/UL (ref 150–450)
PMV BLD AUTO: 11.1 FL (ref 9.2–12.9)
POTASSIUM SERPL-SCNC: 3.7 MMOL/L (ref 3.5–5.1)
PROT SERPL-MCNC: 6.7 G/DL (ref 6–8.4)
PROT UR QL STRIP: NEGATIVE
RBC # BLD AUTO: 3.92 M/UL (ref 4.6–6.2)
SALICYLATES SERPL-MCNC: <5 MG/DL (ref 15–30)
SODIUM SERPL-SCNC: 139 MMOL/L (ref 136–145)
SP GR UR STRIP: 1.01 (ref 1–1.03)
TOXICOLOGY INFORMATION: ABNORMAL
TROPONIN I SERPL DL<=0.01 NG/ML-MCNC: <0.006 NG/ML (ref 0–0.03)
URN SPEC COLLECT METH UR: NORMAL
UROBILINOGEN UR STRIP-ACNC: NEGATIVE EU/DL
WBC # BLD AUTO: 6.16 K/UL (ref 3.9–12.7)

## 2023-12-07 PROCEDURE — 25000242 PHARM REV CODE 250 ALT 637 W/ HCPCS: Performed by: STUDENT IN AN ORGANIZED HEALTH CARE EDUCATION/TRAINING PROGRAM

## 2023-12-07 PROCEDURE — 94761 N-INVAS EAR/PLS OXIMETRY MLT: CPT

## 2023-12-07 PROCEDURE — 85025 COMPLETE CBC W/AUTO DIFF WBC: CPT | Performed by: EMERGENCY MEDICINE

## 2023-12-07 PROCEDURE — 80307 DRUG TEST PRSMV CHEM ANLYZR: CPT | Performed by: STUDENT IN AN ORGANIZED HEALTH CARE EDUCATION/TRAINING PROGRAM

## 2023-12-07 PROCEDURE — 99900035 HC TECH TIME PER 15 MIN (STAT)

## 2023-12-07 PROCEDURE — 84484 ASSAY OF TROPONIN QUANT: CPT | Performed by: STUDENT IN AN ORGANIZED HEALTH CARE EDUCATION/TRAINING PROGRAM

## 2023-12-07 PROCEDURE — 84425 ASSAY OF VITAMIN B-1: CPT | Performed by: STUDENT IN AN ORGANIZED HEALTH CARE EDUCATION/TRAINING PROGRAM

## 2023-12-07 PROCEDURE — 83735 ASSAY OF MAGNESIUM: CPT | Performed by: EMERGENCY MEDICINE

## 2023-12-07 PROCEDURE — 83690 ASSAY OF LIPASE: CPT | Performed by: EMERGENCY MEDICINE

## 2023-12-07 PROCEDURE — 63600175 PHARM REV CODE 636 W HCPCS: Performed by: STUDENT IN AN ORGANIZED HEALTH CARE EDUCATION/TRAINING PROGRAM

## 2023-12-07 PROCEDURE — 83605 ASSAY OF LACTIC ACID: CPT

## 2023-12-07 PROCEDURE — 80143 DRUG ASSAY ACETAMINOPHEN: CPT | Performed by: STUDENT IN AN ORGANIZED HEALTH CARE EDUCATION/TRAINING PROGRAM

## 2023-12-07 PROCEDURE — 96375 TX/PRO/DX INJ NEW DRUG ADDON: CPT

## 2023-12-07 PROCEDURE — 94640 AIRWAY INHALATION TREATMENT: CPT

## 2023-12-07 PROCEDURE — 81003 URINALYSIS AUTO W/O SCOPE: CPT | Mod: 59 | Performed by: EMERGENCY MEDICINE

## 2023-12-07 PROCEDURE — 96365 THER/PROPH/DIAG IV INF INIT: CPT

## 2023-12-07 PROCEDURE — 80179 DRUG ASSAY SALICYLATE: CPT | Performed by: STUDENT IN AN ORGANIZED HEALTH CARE EDUCATION/TRAINING PROGRAM

## 2023-12-07 PROCEDURE — 80053 COMPREHEN METABOLIC PANEL: CPT | Performed by: EMERGENCY MEDICINE

## 2023-12-07 PROCEDURE — 25000003 PHARM REV CODE 250: Performed by: STUDENT IN AN ORGANIZED HEALTH CARE EDUCATION/TRAINING PROGRAM

## 2023-12-07 PROCEDURE — 83880 ASSAY OF NATRIURETIC PEPTIDE: CPT | Performed by: STUDENT IN AN ORGANIZED HEALTH CARE EDUCATION/TRAINING PROGRAM

## 2023-12-07 PROCEDURE — 82140 ASSAY OF AMMONIA: CPT | Performed by: STUDENT IN AN ORGANIZED HEALTH CARE EDUCATION/TRAINING PROGRAM

## 2023-12-07 PROCEDURE — 96374 THER/PROPH/DIAG INJ IV PUSH: CPT | Mod: 59

## 2023-12-07 PROCEDURE — 82077 ASSAY SPEC XCP UR&BREATH IA: CPT | Performed by: EMERGENCY MEDICINE

## 2023-12-07 PROCEDURE — 99285 EMERGENCY DEPT VISIT HI MDM: CPT | Mod: 25

## 2023-12-07 PROCEDURE — 36416 COLLJ CAPILLARY BLOOD SPEC: CPT

## 2023-12-07 RX ORDER — THIAMINE HYDROCHLORIDE 100 MG/ML
400 INJECTION, SOLUTION INTRAMUSCULAR; INTRAVENOUS DAILY
Status: DISCONTINUED | OUTPATIENT
Start: 2023-12-08 | End: 2023-12-07

## 2023-12-07 RX ORDER — IPRATROPIUM BROMIDE AND ALBUTEROL SULFATE 2.5; .5 MG/3ML; MG/3ML
3 SOLUTION RESPIRATORY (INHALATION)
Status: COMPLETED | OUTPATIENT
Start: 2023-12-07 | End: 2023-12-07

## 2023-12-07 RX ORDER — MAGNESIUM SULFATE HEPTAHYDRATE 40 MG/ML
2 INJECTION, SOLUTION INTRAVENOUS ONCE
Status: DISCONTINUED | OUTPATIENT
Start: 2023-12-07 | End: 2023-12-07

## 2023-12-07 RX ORDER — THIAMINE HYDROCHLORIDE 100 MG/ML
400 INJECTION, SOLUTION INTRAMUSCULAR; INTRAVENOUS
Status: COMPLETED | OUTPATIENT
Start: 2023-12-07 | End: 2023-12-07

## 2023-12-07 RX ORDER — LORAZEPAM 2 MG/ML
1 INJECTION INTRAMUSCULAR
Status: COMPLETED | OUTPATIENT
Start: 2023-12-07 | End: 2023-12-07

## 2023-12-07 RX ADMIN — THIAMINE HYDROCHLORIDE: 100 INJECTION, SOLUTION INTRAMUSCULAR; INTRAVENOUS at 10:12

## 2023-12-07 RX ADMIN — THIAMINE HYDROCHLORIDE 400 MG: 100 INJECTION, SOLUTION INTRAMUSCULAR; INTRAVENOUS at 10:12

## 2023-12-07 RX ADMIN — LORAZEPAM 1 MG: 2 INJECTION INTRAMUSCULAR; INTRAVENOUS at 11:12

## 2023-12-07 RX ADMIN — IPRATROPIUM BROMIDE AND ALBUTEROL SULFATE 3 ML: 2.5; .5 SOLUTION RESPIRATORY (INHALATION) at 10:12

## 2023-12-07 NOTE — Clinical Note
Diagnosis: Alcohol withdrawal [291.81.ICD-9-CM]   Future Attending Provider: BÁRBARA ANDRADE [[id number]]   Admitting Provider:: BONITA LARA [[id number]]   Reason for IP Medical Treatment  (Clinical interventions that can only be accomplished in the IP setting? ) :: alcohol withdrawal   I certify that Inpatient services for greater than or equal to 2 midnights are medically necessary:: Yes   Plans for Post-Acute care--if anticipated (pick the single best option):: A. No post acute care anticipated at this time

## 2023-12-08 PROBLEM — I63.9 STROKE: Status: ACTIVE | Noted: 2023-12-08

## 2023-12-08 PROBLEM — R47.81 SLURRED SPEECH: Status: ACTIVE | Noted: 2023-12-08

## 2023-12-08 PROBLEM — R26.2 DIFFICULTY WALKING: Status: ACTIVE | Noted: 2023-12-08

## 2023-12-08 PROBLEM — E51.2: Status: ACTIVE | Noted: 2023-12-08

## 2023-12-08 PROBLEM — I63.9 STROKE: Status: RESOLVED | Noted: 2023-12-08 | Resolved: 2023-12-08

## 2023-12-08 LAB
ASCENDING AORTA: 3.42 CM
AV INDEX (PROSTH): 0.36
AV MEAN GRADIENT: 28 MMHG
AV PEAK GRADIENT: 50 MMHG
AV VALVE AREA BY VELOCITY RATIO: 0.87 CM²
AV VALVE AREA: 1.16 CM²
AV VELOCITY RATIO: 0.27
BSA FOR ECHO PROCEDURE: 1.96 M2
CHOLEST SERPL-MCNC: 214 MG/DL (ref 120–199)
CHOLEST/HDLC SERPL: 4 {RATIO} (ref 2–5)
CV ECHO LV RWT: 0.37 CM
DOP CALC AO PEAK VEL: 3.54 M/S
DOP CALC AO VTI: 68.1 CM
DOP CALC LVOT AREA: 3.2 CM2
DOP CALC LVOT DIAMETER: 2.03 CM
DOP CALC LVOT PEAK VEL: 0.95 M/S
DOP CALC LVOT STROKE VOLUME: 79.26 CM3
DOP CALC MV VTI: 34.5 CM
DOP CALCLVOT PEAK VEL VTI: 24.5 CM
E WAVE DECELERATION TIME: 210.79 MSEC
E/A RATIO: 0.94
E/E' RATIO: 11.89 M/S
ECHO LV POSTERIOR WALL: 1.03 CM (ref 0.6–1.1)
ESTIMATED AVG GLUCOSE: 105 MG/DL (ref 68–131)
FRACTIONAL SHORTENING: 26 % (ref 28–44)
HBA1C MFR BLD: 5.3 % (ref 4–5.6)
HDLC SERPL-MCNC: 53 MG/DL (ref 40–75)
HDLC SERPL: 24.8 % (ref 20–50)
INTERVENTRICULAR SEPTUM: 1.15 CM (ref 0.6–1.1)
IVC DIAMETER: 2.58 CM
LA MAJOR: 5.16 CM
LA MINOR: 5.71 CM
LA WIDTH: 4 CM
LDLC SERPL CALC-MCNC: 148 MG/DL (ref 63–159)
LEFT ATRIUM SIZE: 3.87 CM
LEFT ATRIUM VOLUME INDEX: 37 ML/M2
LEFT ATRIUM VOLUME: 71.33 CM3
LEFT INTERNAL DIMENSION IN SYSTOLE: 4.19 CM (ref 2.1–4)
LEFT VENTRICLE DIASTOLIC VOLUME INDEX: 80.99 ML/M2
LEFT VENTRICLE DIASTOLIC VOLUME: 156.31 ML
LEFT VENTRICLE MASS INDEX: 129 G/M2
LEFT VENTRICLE SYSTOLIC VOLUME INDEX: 40.5 ML/M2
LEFT VENTRICLE SYSTOLIC VOLUME: 78.15 ML
LEFT VENTRICULAR INTERNAL DIMENSION IN DIASTOLE: 5.64 CM (ref 3.5–6)
LEFT VENTRICULAR MASS: 249.21 G
LV LATERAL E/E' RATIO: 10.27 M/S
LV SEPTAL E/E' RATIO: 14.13 M/S
LVOT MG: 2.1 MMHG
LVOT MV: 0.7 CM/S
MV MEAN GRADIENT: 3 MMHG
MV PEAK A VEL: 1.2 M/S
MV PEAK E VEL: 1.13 M/S
MV PEAK GRADIENT: 6 MMHG
MV STENOSIS PRESSURE HALF TIME: 61.13 MS
MV VALVE AREA BY CONTINUITY EQUATION: 2.3 CM2
MV VALVE AREA P 1/2 METHOD: 3.6 CM2
NONHDLC SERPL-MCNC: 161 MG/DL
PISA TR MAX VEL: 2.5 M/S
POCT GLUCOSE: 103 MG/DL (ref 70–110)
PV PEAK GRADIENT: 7 MMHG
PV PEAK VELOCITY: 1.28 M/S
RA MAJOR: 5.23 CM
RA PRESSURE ESTIMATED: 15 MMHG
RA WIDTH: 4 CM
RIGHT VENTRICULAR END-DIASTOLIC DIMENSION: 3.36 CM
RV TB RVSP: 18 MMHG
RV TISSUE DOPPLER FREE WALL SYSTOLIC VELOCITY 1 (APICAL 4 CHAMBER VIEW): 23.67 CM/S
SINUS: 3.71 CM
STJ: 2.5 CM
T4 FREE SERPL-MCNC: 0.72 NG/DL (ref 0.71–1.51)
TDI LATERAL: 0.11 M/S
TDI SEPTAL: 0.08 M/S
TDI: 0.1 M/S
TR MAX PG: 25 MMHG
TRICUSPID ANNULAR PLANE SYSTOLIC EXCURSION: 2.57 CM
TRIGL SERPL-MCNC: 65 MG/DL (ref 30–150)
TSH SERPL DL<=0.005 MIU/L-ACNC: 9.08 UIU/ML (ref 0.4–4)
TV REST PULMONARY ARTERY PRESSURE: 40 MMHG
Z-SCORE OF LEFT VENTRICULAR DIMENSION IN END DIASTOLE: 0.32
Z-SCORE OF LEFT VENTRICULAR DIMENSION IN END SYSTOLE: 1.74

## 2023-12-08 PROCEDURE — 84443 ASSAY THYROID STIM HORMONE: CPT | Performed by: STUDENT IN AN ORGANIZED HEALTH CARE EDUCATION/TRAINING PROGRAM

## 2023-12-08 PROCEDURE — 63600175 PHARM REV CODE 636 W HCPCS: Performed by: STUDENT IN AN ORGANIZED HEALTH CARE EDUCATION/TRAINING PROGRAM

## 2023-12-08 PROCEDURE — 80061 LIPID PANEL: CPT | Performed by: STUDENT IN AN ORGANIZED HEALTH CARE EDUCATION/TRAINING PROGRAM

## 2023-12-08 PROCEDURE — 97165 OT EVAL LOW COMPLEX 30 MIN: CPT

## 2023-12-08 PROCEDURE — 97161 PT EVAL LOW COMPLEX 20 MIN: CPT

## 2023-12-08 PROCEDURE — 21400001 HC TELEMETRY ROOM

## 2023-12-08 PROCEDURE — 25000003 PHARM REV CODE 250: Performed by: STUDENT IN AN ORGANIZED HEALTH CARE EDUCATION/TRAINING PROGRAM

## 2023-12-08 PROCEDURE — 84439 ASSAY OF FREE THYROXINE: CPT | Performed by: STUDENT IN AN ORGANIZED HEALTH CARE EDUCATION/TRAINING PROGRAM

## 2023-12-08 PROCEDURE — 97116 GAIT TRAINING THERAPY: CPT

## 2023-12-08 PROCEDURE — 83036 HEMOGLOBIN GLYCOSYLATED A1C: CPT | Performed by: STUDENT IN AN ORGANIZED HEALTH CARE EDUCATION/TRAINING PROGRAM

## 2023-12-08 PROCEDURE — 97535 SELF CARE MNGMENT TRAINING: CPT

## 2023-12-08 RX ORDER — LEVOTHYROXINE SODIUM 50 UG/1
50 TABLET ORAL
Status: DISCONTINUED | OUTPATIENT
Start: 2023-12-08 | End: 2023-12-09 | Stop reason: HOSPADM

## 2023-12-08 RX ORDER — SODIUM CHLORIDE 0.9 % (FLUSH) 0.9 %
10 SYRINGE (ML) INJECTION
Status: DISCONTINUED | OUTPATIENT
Start: 2023-12-08 | End: 2023-12-09 | Stop reason: HOSPADM

## 2023-12-08 RX ORDER — LORAZEPAM 0.5 MG/1
2 TABLET ORAL EVERY 4 HOURS PRN
Status: DISCONTINUED | OUTPATIENT
Start: 2023-12-08 | End: 2023-12-09 | Stop reason: HOSPADM

## 2023-12-08 RX ORDER — DIAZEPAM 5 MG/1
5 TABLET ORAL EVERY 8 HOURS
Status: DISCONTINUED | OUTPATIENT
Start: 2023-12-08 | End: 2023-12-09 | Stop reason: HOSPADM

## 2023-12-08 RX ORDER — IBUPROFEN 200 MG
24 TABLET ORAL
Status: DISCONTINUED | OUTPATIENT
Start: 2023-12-08 | End: 2023-12-09 | Stop reason: HOSPADM

## 2023-12-08 RX ORDER — SERTRALINE HYDROCHLORIDE 50 MG/1
150 TABLET, FILM COATED ORAL DAILY
Status: DISCONTINUED | OUTPATIENT
Start: 2023-12-08 | End: 2023-12-09 | Stop reason: HOSPADM

## 2023-12-08 RX ORDER — ALBUTEROL SULFATE 0.83 MG/ML
SOLUTION RESPIRATORY (INHALATION)
COMMUNITY
Start: 2023-10-23

## 2023-12-08 RX ORDER — GABAPENTIN 300 MG/1
300 CAPSULE ORAL 3 TIMES DAILY
COMMUNITY

## 2023-12-08 RX ORDER — ACETAMINOPHEN 325 MG/1
650 TABLET ORAL EVERY 8 HOURS PRN
Status: DISCONTINUED | OUTPATIENT
Start: 2023-12-08 | End: 2023-12-09 | Stop reason: HOSPADM

## 2023-12-08 RX ORDER — ISOPROPYL ALCOHOL 70 ML/100ML
SWAB TOPICAL
COMMUNITY
Start: 2023-11-08

## 2023-12-08 RX ORDER — CALCIUM CITRATE/VITAMIN D3 200MG-6.25
1 TABLET ORAL 3 TIMES DAILY
COMMUNITY
Start: 2023-11-08

## 2023-12-08 RX ORDER — LEVOTHYROXINE SODIUM 75 UG/1
75 TABLET ORAL
COMMUNITY

## 2023-12-08 RX ORDER — DIPHENHYDRAMINE HCL 25 MG
TABLET ORAL
COMMUNITY
Start: 2023-11-08

## 2023-12-08 RX ORDER — ACETAMINOPHEN 325 MG/1
650 TABLET ORAL EVERY 4 HOURS PRN
Status: DISCONTINUED | OUTPATIENT
Start: 2023-12-08 | End: 2023-12-09 | Stop reason: HOSPADM

## 2023-12-08 RX ORDER — NALOXONE HCL 0.4 MG/ML
0.02 VIAL (ML) INJECTION
Status: DISCONTINUED | OUTPATIENT
Start: 2023-12-08 | End: 2023-12-09 | Stop reason: HOSPADM

## 2023-12-08 RX ORDER — IBUPROFEN 200 MG
16 TABLET ORAL
Status: DISCONTINUED | OUTPATIENT
Start: 2023-12-08 | End: 2023-12-09 | Stop reason: HOSPADM

## 2023-12-08 RX ORDER — ASPIRIN 81 MG/1
81 TABLET ORAL DAILY
Status: DISCONTINUED | OUTPATIENT
Start: 2023-12-08 | End: 2023-12-09 | Stop reason: HOSPADM

## 2023-12-08 RX ORDER — CLONAZEPAM 2 MG/1
1 TABLET ORAL 3 TIMES DAILY
COMMUNITY
Start: 2023-11-22

## 2023-12-08 RX ORDER — GLUCAGON 1 MG
1 KIT INJECTION
Status: DISCONTINUED | OUTPATIENT
Start: 2023-12-08 | End: 2023-12-09 | Stop reason: HOSPADM

## 2023-12-08 RX ORDER — LANCETS 33 GAUGE
EACH MISCELLANEOUS 3 TIMES DAILY
COMMUNITY
Start: 2023-11-08

## 2023-12-08 RX ORDER — ALFUZOSIN HYDROCHLORIDE 10 MG/1
10 TABLET, EXTENDED RELEASE ORAL
COMMUNITY

## 2023-12-08 RX ORDER — LANOLIN ALCOHOL/MO/W.PET/CERES
100 CREAM (GRAM) TOPICAL EVERY 8 HOURS
Status: DISCONTINUED | OUTPATIENT
Start: 2023-12-10 | End: 2023-12-09 | Stop reason: HOSPADM

## 2023-12-08 RX ORDER — ATORVASTATIN CALCIUM 40 MG/1
40 TABLET, FILM COATED ORAL NIGHTLY
Status: DISCONTINUED | OUTPATIENT
Start: 2023-12-08 | End: 2023-12-09 | Stop reason: HOSPADM

## 2023-12-08 RX ADMIN — SERTRALINE HYDROCHLORIDE 150 MG: 50 TABLET ORAL at 08:12

## 2023-12-08 RX ADMIN — DIAZEPAM 5 MG: 5 TABLET ORAL at 10:12

## 2023-12-08 RX ADMIN — DIAZEPAM 5 MG: 5 TABLET ORAL at 02:12

## 2023-12-08 RX ADMIN — ASPIRIN 81 MG: 81 TABLET, COATED ORAL at 08:12

## 2023-12-08 RX ADMIN — ATORVASTATIN CALCIUM 40 MG: 40 TABLET, FILM COATED ORAL at 09:12

## 2023-12-08 RX ADMIN — THIAMINE HYDROCHLORIDE 500 MG: 100 INJECTION, SOLUTION INTRAMUSCULAR; INTRAVENOUS at 02:12

## 2023-12-08 RX ADMIN — THIAMINE HYDROCHLORIDE 500 MG: 100 INJECTION, SOLUTION INTRAMUSCULAR; INTRAVENOUS at 10:12

## 2023-12-08 RX ADMIN — LEVOTHYROXINE SODIUM 50 MCG: 50 TABLET ORAL at 08:12

## 2023-12-08 RX ADMIN — LORAZEPAM 2 MG: 0.5 TABLET ORAL at 08:12

## 2023-12-08 NOTE — ADMISSIONCARE
AdmissionCare    Guideline: Neurology, Inpatient    Based on the indications selected for the patient, the bed status of Admit to Inpatient was determined to be MET    The following indications were selected as present at the time of evaluation of the patient:      Mental status change requiring inpatient care, as indicated by 1 or more of the following:   -     - Etiology suspected that requires treatment beyond observation care time frame (eg, Wernicke encephalopathy)    AdmissionCare documentation entered by: Tonya Garcia    [facility], 27th edition, Copyright © 2023 [facility], [facility] All Rights Reserved.  [id number]:30:19-06:00

## 2023-12-08 NOTE — ASSESSMENT & PLAN NOTE
PT with known history of alcohol use. Starting on ciwa with valium scheduled. Monitor for withdrawals and prn ativan ordered. Replacing vitamins.

## 2023-12-08 NOTE — PT/OT/SLP EVAL
"Occupational Therapy   Evaluation, tx    Name: Adrien Mckeon  MRN: [id number]  Admitting Diagnosis: Difficulty walking  Recent Surgery: * No surgery found *    The primary encounter diagnosis was Encephalopathy acute. Diagnoses of Alcohol withdrawal, Chest pain, and CVA (cerebral vascular accident) were also pertinent to this visit.  MRI (-) for acute CVA  Recommendations:     Discharge Recommendations: Low Intensity Therapy  Discharge Equipment Recommendations:  shower chair  Barriers to discharge:   (hx multiple falls, ETOH abuse, delusional, high risk for fall and readmission)    Assessment:     Adrien Mckeon is a 68 y.o. male with a medical diagnosis of Difficulty walking.  He presents with Performance deficits affecting function: weakness, impaired endurance, impaired self care skills, impaired functional mobility, gait instability, impaired balance, impaired cognition, decreased coordination, decreased upper extremity function, decreased lower extremity function, decreased safety awareness, pain, impaired coordination, impaired cardiopulmonary response to activity, decreased ROM.      Pt was sleeping in bed, easily aroused however eyes heavy and closing intermittently and needed minimal stimuli to stay awake. Pt c/o R neck pain "11 1/2 /10" he cited. He was slurring his words when talking, speech was tangential and thoughts delusional-  talking about "neighbors trying to kill him and his wife." Per old chart, pt had similar hospital admission ~ 1 year ago. He has hx of FTOH abuse. Pt reportedly lives with his spouse in a trailer and was Indep. He is not safe to return home at this time as he has had multiple falls in the past and recently at home. Pt would benefit from  Continued OT to maximize safety and Indep with ADLS.     Rehab Prognosis: Fair; patient would benefit from acute skilled OT services to address these deficits and reach maximum level of function.       Plan:     Patient to be seen  (3-5x/wk) to " "address the above listed problems via self-care/home management, therapeutic activities, therapeutic exercises  Plan of Care Expires: 01/08/24  Plan of Care Reviewed with: patient    Subjective     Chief Complaint: R neck pain.  Patient/Family Comments/goals: agreeable to OT. "I want to go home, they are trying to kill my wife."    Occupational Profile:  Living Environment: pt lives with spouse in a trailer with 4 steps with BHR; t/s combo.   Previous level of function: pt reportedly was Indep ADLS, ambulated Indep without a device; drives.  Roles and Routines: caretaker of self, cut grass  Equipment Used at Home: none  Assistance upon Discharge: spouse?    Pain/Comfort:  Pain Rating 1:  ("11 1/2" pt reported)  Location - Side 1: Right  Location - Orientation 1: lateral  Location 1: neck  Pain Addressed 1: Reposition, Distraction, Nurse notified  Pain Rating Post-Intervention 1:  (not rated but complaining and touching area)    Patients cultural, spiritual, Quaker conflicts given the current situation: no    Objective:     Communicated with: nurse prior to session.  Patient found HOB elevated with telemetry, oxygen, blood pressure cuff, pulse ox (continuous), peripheral IV upon OT entry to room.    General Precautions: Standard, fall, aspiration, NPO (delusional)  Orthopedic Precautions: N/A  Braces: N/A  Respiratory Status: Nasal cannula, flow 3 L/min resting 95% on 3L, 90-91% with exertional activity on RA, recovered 96% on 3L.    Occupational Performance:    Bed Mobility:    Patient completed Scooting/Bridging with contact guard assistance  Patient completed Supine to Sit with contact guard assistance  Patient completed Sit to Supine with contact guard assistance    Functional Mobility/Transfers:  Patient completed Sit <> Stand Transfer with stand by assistance  with  rolling walker   Patient completed Toilet Transfer Step Transfer technique with contact guard assistance with  rolling walker and max vc for " walker placement, proximity , safely sequencing steps with RW  Functional Mobility: ambulated CGA straight path en route to bathroom, min A with turns and walker management.  ; SBA-CGA ambulating back from bathroom ~40' with RW, mod vc for sequencing and safety awareness/judgement.    Activities of Daily Living:  Feeding:  NPO    Grooming: stand by assistance standing inside Rw at sink to wash hands with min vc for thoroughness.   Upper Body Dressing: stand by assistance donned gown over back seated EOB  Lower Body Dressing: minimum assistance donning socks seated EOB  Toileting: contact guard assistance standing over toilet with RW to urinate-CGA for steadying assist    Cognitive/Visual Perceptual:  Cognitive/Psychosocial Skills:     -       Oriented to: Person  -       Follows Commands/attention:Inattentive and Follows one-step commands  -       Communication: dysarthria  -       Memory: Impaired STM and Poor immediate recall  -       Safety awareness/insight to disability: impaired   -       Mood/Affect/Coping skills/emotional control: Cooperative and having delusions  Visual/Perceptual:      -Intact     Physical Exam:  Balance:    -       sitting: SBA   dynamic: Min A  standing: SBA  dynamic: Min A  Postural examination/scapula alignment:    -       Rounded shoulders  Dominant hand:    -       right  Upper Extremity Range of Motion:     -       Right Upper Extremity: WFL-slow to move arms 2/2 stiffness and soreness reported  -       Left Upper Extremity: WFL, same as above  Upper Extremity Strength:    -       Right Upper Extremity: WFL  -       Left Upper Extremity: WFL   Strength:    -       Right Upper Extremity: WFL  -       Left Upper Extremity: WFL    AMPAC 6 Click ADL:  AMPAC Total Score: 20    Treatment & Education:  Purpose of OT and POC, ongoing education needed, pt is altered.  Pt seen for self care retraining and fx mobility with modifications and adapted tech as stated above.  Redirection and  re-orientation to place, time and situation. Lights turned on, curtain opened, Tv turned on.  All questions/concerns addressed within scope.  Call don't fall explained to pt and call bell use explained.      Patient left HOB elevated with all lines intact, call button in reach, and nurse notified    GOALS:   Multidisciplinary Problems       Occupational Therapy Goals          Problem: Occupational Therapy    Goal Priority Disciplines Outcome Interventions   Occupational Therapy Goal     OT, PT/OT Ongoing, Progressing    Description: Goals to be met by: 12/22/2023     Patient will increase functional independence with ADLs by performing:    UE Dressing with Modified Lyman.  LE Dressing with Modified Lyman.  Grooming while standing at sink with Modified Lyman.  Toileting from toilet with Modified Lyman for hygiene and clothing management.   Rolling to Bilateral with Modified Lyman.   Supine to sit with Modified Lyman.  Step transfer with Modified Lyman  Toilet transfer to toilet with Modified Lyman.  Upper extremity exercise program x15 reps per handout, with independence.                         History:     Past Medical History:   Diagnosis Date    Arthritis     COPD (chronic obstructive pulmonary disease)     Hypertension     Prostate disorder     Thyroid disease          Past Surgical History:   Procedure Laterality Date    HAND SURGERY      left hand       Time Tracking:     OT Date of Treatment: 12/08/23  OT Start Time: 1215  OT Stop Time: 1244  OT Total Time (min): 29 min    Billable Minutes:Evaluation 10  Self Care/Home Management 19  Total Time 29    12/8/2023

## 2023-12-08 NOTE — SUBJECTIVE & OBJECTIVE
Past Medical History:   Diagnosis Date    Arthritis     COPD (chronic obstructive pulmonary disease)     Hypertension     Prostate disorder     Thyroid disease        Past Surgical History:   Procedure Laterality Date    HAND SURGERY      left hand       Review of patient's allergies indicates:  No Known Allergies    No current facility-administered medications on file prior to encounter.     Current Outpatient Medications on File Prior to Encounter   Medication Sig    alfuzosin (UROXATRAL) 10 mg Tb24 Take 10 mg by mouth daily with breakfast.    amLODIPine (NORVASC) 10 MG tablet Take 10 mg by mouth once daily.    clonazePAM (KLONOPIN) 2 MG Tab Take 1 tablet by mouth 3 (three) times daily.    DROPSAFE ALCOHOL PREP PADS PadM Apply topically.    gabapentin (NEURONTIN) 300 MG capsule Take 300 mg by mouth 3 (three) times daily.    levothyroxine (SYNTHROID) 75 MCG tablet Take 75 mcg by mouth before breakfast.    sertraline (ZOLOFT) 50 MG tablet Take 3 tablets (150 mg total) by mouth once daily.    TRUE METRIX AIR GLUCOSE METER Misc     TRUE METRIX GLUCOSE TEST STRIP Strp 1 strip 3 (three) times daily.    TRUEPLUS LANCETS 33 gauge Misc Apply topically 3 (three) times daily.    albuterol (PROVENTIL) 2.5 mg /3 mL (0.083 %) nebulizer solution SMARTSI Vial(s) Via Nebulizer    albuterol (PROVENTIL/VENTOLIN HFA) 90 mcg/actuation inhaler Inhale 1-2 puffs into the lungs every 6 (six) hours as needed. Rescue    [DISCONTINUED] aspirin (ECOTRIN) 81 MG EC tablet Take 81 mg by mouth once daily.    [DISCONTINUED] folic acid (FOLVITE) 1 MG tablet Take 1 tablet (1 mg total) by mouth once daily.    [DISCONTINUED] gabapentin (NEURONTIN) 100 MG capsule Take 1 capsule (100 mg total) by mouth 3 (three) times daily. for 15 days    [DISCONTINUED] levothyroxine (SYNTHROID) 50 MCG tablet Take 1 tablet (50 mcg total) by mouth before breakfast.    [DISCONTINUED] LIDOcaine (LIDODERM) 5 % Place 1 patch onto the skin once daily. Remove & Discard  patch within 12 hours or as directed by MD    [DISCONTINUED] melatonin (MELATIN) 3 mg tablet Take 1 tablet (3 mg total) by mouth daily with dinner or evening meal.    [DISCONTINUED] tiotropium (SPIRIVA) 18 mcg inhalation capsule Inhale 18 mcg into the lungs once daily. Controller     Family History       Problem Relation (Age of Onset)    Cancer Mother, Father, Sister    Heart disease Father          Tobacco Use    Smoking status: Every Day     Current packs/day: 1.50     Types: Cigarettes    Smokeless tobacco: Never   Substance and Sexual Activity    Alcohol use: Yes     Alcohol/week: 4.0 standard drinks of alcohol     Types: 4 Cans of beer per week     Comment: 8-10 beers a day    Drug use: Yes     Frequency: 7.0 times per week     Types: Marijuana, Methamphetamines     Comment: lasy use 3 months ago    Sexual activity: Not Currently     Review of Systems  Objective:     Vital Signs (Most Recent):  Temp: 98.2 °F (36.8 °C) (12/08/23 1400)  Pulse: 72 (12/08/23 1400)  Resp: 17 (12/08/23 1400)  BP: (!) 148/85 (12/08/23 1400)  SpO2: 96 % (12/08/23 1400) Vital Signs (24h Range):  Temp:  [98 °F (36.7 °C)-98.9 °F (37.2 °C)] 98.2 °F (36.8 °C)  Pulse:  [67-91] 72  Resp:  [11-22] 17  SpO2:  [90 %-98 %] 96 %  BP: (100-148)/(63-85) 148/85     Weight: 81.6 kg (180 lb)  Body mass index is 28.19 kg/m².     Physical Exam  Vitals reviewed.   Constitutional:       General: He is not in acute distress.     Appearance: He is ill-appearing (chronic). He is not diaphoretic.   HENT:      Head: Normocephalic and atraumatic.      Mouth/Throat:      Mouth: Mucous membranes are dry.      Pharynx: Oropharynx is clear.   Eyes:      General: No scleral icterus.     Extraocular Movements: Extraocular movements intact.   Cardiovascular:      Rate and Rhythm: Normal rate and regular rhythm.   Pulmonary:      Effort: Pulmonary effort is normal. No respiratory distress (on RA).   Abdominal:      General: There is no distension.      Palpations:  Abdomen is soft.      Tenderness: There is no abdominal tenderness. There is no guarding or rebound.   Musculoskeletal:         General: No tenderness.      Cervical back: Neck supple. No rigidity or tenderness.   Skin:     General: Skin is warm and dry.   Neurological:      General: No focal deficit present.      Mental Status: He is alert and oriented to person, place, and time.      Cranial Nerves: No cranial nerve deficit.      Sensory: No sensory deficit.      Motor: No weakness (moving all extremities. BLE strength grossly intact.).   Psychiatric:         Mood and Affect: Mood normal.         Behavior: Behavior normal.                Significant Labs: All pertinent labs within the past 24 hours have been reviewed.    Significant Imaging: I have reviewed all pertinent imaging results/findings within the past 24 hours.
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
## 2023-12-08 NOTE — CONSULTS
[facility] Emergency Dept  Neurology Consult Note    Patient Name: Adrien Mckeon  Age: 68 y.o.  MRN: [id number]  Admission Date: 12/7/2023  Reason for consult:  Concern for stroke    CC:  Flank pain    HPI:   Adrien Mckeon is a 68 y.o. year old male with past medical history of tobacco abuse, alcohol abuse, COPD presented by EMS to the ER yesterday after having multiple falls at home and instability on his feet.  History obtained by chart review.  Patient is lethargic and unable to provide history.  Attempt to call family on chart, unable to reach them at this time.    Per chart review, patient was having difficulty with ambulation and having multiple falls in the last couple of days.  He drinks alcohol every single day and drank 2 beers yesterday.  He also had complaints of right-sided flank pain.    In the ED, vitals were unremarkable.  Patient was noted to have slurred speech without any other focal neurological deficits.  Basic blood work was unremarkable as well.  CT head was obtained which was negative for stroke.  UDS was positive for marijuana and amphetamines.  Given the gait instability and slurred speech, there was a concern for stroke and therefore Neurology was consulted.  MRI brain was obtained which was negative for any acute infarct.  Upon my evaluation of the patient, he had received Ativan for alcohol withdrawal and was unable to provide history or cooperate with the exam.        Histories:  Allergies:  Patient has no known allergies.    Current Medications:    Current Facility-Administered Medications   Medication Dose Route Frequency Provider Last Rate Last Admin    acetaminophen tablet 650 mg  650 mg Oral Q8H PRN Justin Stevens III, MD        acetaminophen tablet 650 mg  650 mg Oral Q4H PRN Justin Stevens III, MD        aspirin EC tablet 81 mg  81 mg Oral Daily Justin Stevens III, MD   81 mg at 12/08/23 0813    atorvastatin tablet 40 mg  40 mg Oral QHS Justin Stevens III, MD         dextrose 10% bolus 125 mL 125 mL  12.5 g Intravenous PRN Justin Stevens III, MD        dextrose 10% bolus 250 mL 250 mL  25 g Intravenous PRN Justin Stevens III, MD        diazePAM tablet 5 mg  5 mg Oral Q8H Justin Stevens III, MD        glucagon (human recombinant) injection 1 mg  1 mg Intramuscular PRN Justin Stevens III, MD        glucose chewable tablet 16 g  16 g Oral PRN Justin Stevens III, MD        glucose chewable tablet 24 g  24 g Oral PRN Justin Stevens III, MD        levothyroxine tablet 50 mcg  50 mcg Oral Before breakfast Justin Stevens III, MD   50 mcg at 12/08/23 0812    LORazepam tablet 2 mg  2 mg Oral Q4H PRN Justin Stevens III, MD   2 mg at 12/08/23 0812    naloxone 0.4 mg/mL injection 0.02 mg  0.02 mg Intravenous PRN Justin Stevens III, MD        sertraline tablet 150 mg  150 mg Oral Daily Justin Stevens III, MD   150 mg at 12/08/23 0812    sodium chloride 0.9% bolus 500 mL 500 mL  500 mL Intravenous Continuous PRN Justin Stevens III, MD        sodium chloride 0.9% flush 10 mL  10 mL Intravenous PRN Justin Stevens III, MD        sodium chloride 0.9% flush 10 mL  10 mL Intravenous PRN Justin Stevens III, MD        thiamine (B-1) 500 mg in dextrose 5 % (D5W) 100 mL IVPB  500 mg Intravenous Q8H Justin Stevens III, MD        Followed by    [START ON 12/10/2023] thiamine tablet 100 mg  100 mg Oral Q8H Justin Stevens III, MD         Current Outpatient Medications   Medication Sig Dispense Refill    alfuzosin (UROXATRAL) 10 mg Tb24 Take 10 mg by mouth daily with breakfast.      amLODIPine (NORVASC) 10 MG tablet Take 10 mg by mouth once daily.      clonazePAM (KLONOPIN) 2 MG Tab Take 1 tablet by mouth 3 (three) times daily.      DROPSAFE ALCOHOL PREP PADS PadM Apply topically.      gabapentin (NEURONTIN) 300 MG capsule Take 300 mg by mouth 3 (three) times daily.      levothyroxine (SYNTHROID) 75 MCG tablet Take 75 mcg by mouth before breakfast.      sertraline (ZOLOFT) 50 MG  "tablet Take 3 tablets (150 mg total) by mouth once daily. 90 tablet 11    TRUE METRIX AIR GLUCOSE METER Mis       TRUE METRIX GLUCOSE TEST STRIP Strp 1 strip 3 (three) times daily.      TRUEPLUS LANCETS 33 gauge Misc Apply topically 3 (three) times daily.      albuterol (PROVENTIL) 2.5 mg /3 mL (0.083 %) nebulizer solution SMARTSI Vial(s) Via Nebulizer      albuterol (PROVENTIL/VENTOLIN HFA) 90 mcg/actuation inhaler Inhale 1-2 puffs into the lungs every 6 (six) hours as needed. Rescue 1 Inhaler 0       Medical:   Past Medical History:   Diagnosis Date    Arthritis     COPD (chronic obstructive pulmonary disease)     Hypertension     Prostate disorder     Thyroid disease         Surgeries:   Past Surgical History:   Procedure Laterality Date    HAND SURGERY      left hand        Family:   Family History   Problem Relation Age of Onset    Cancer Mother     Cancer Father     Heart disease Father     Cancer Sister        Tobacco Use    Smoking status: Every Day     Current packs/day: 1.50     Types: Cigarettes    Smokeless tobacco: Never   Substance and Sexual Activity    Alcohol use: Yes     Alcohol/week: 4.0 standard drinks of alcohol     Types: 4 Cans of beer per week     Comment: 8-10 beers a day    Drug use: Yes     Frequency: 7.0 times per week     Types: Marijuana, Methamphetamines     Comment: lasy use 3 months ago    Sexual activity: Not Currently         Physical Exam:     Physical Examination  BP (!) 148/85 (BP Location: Right arm, Patient Position: Sitting)   Pulse 72   Temp 98.2 °F (36.8 °C) (Oral)   Resp 17   Ht 5' 7" (1.702 m)   Wt 81.6 kg (180 lb)   SpO2 96%   BMI 28.19 kg/m²   Body mass index is 28.19 kg/m².    Neurological Exam  Mental Status:   Patient is drowsy and difficult to awaken.  Following simple commands after repeated stimulation    ?CN: ?  II, III, IV, VI: PERRL, EOMI, no nystagmus, fundus not visualized due to inadequate dilation?VII: symmetrical facial movement, nice smile, no " drooping.           ?Motor:   Moving all extremities equally.  Non cooperative to exam.                Reflexes:    Bicep Tricep Brachioradial Patellar Achilles   Left 2+ 2+ 2+ 2+ 1+   Right 2+ 2+ 2+ 2+ 1+   No Clonus, down going toes b/l.    Sensation: on both UEs and LEs    ?Light Touch:  Patient uncooperative    ?Coordination:    ?Tremor: Absent    ??Gait:    Not tested    Significant Labs: BMP:   Recent Labs   Lab 12/07/23 2056         K 3.7      CO2 28   BUN 10   CREATININE 0.8   CALCIUM 9.0   MG 2.3     CBC:   Recent Labs   Lab 12/07/23 2056   WBC 6.16   HGB 11.6*   HCT 35.6*          Significant Imaging:   Following images were personally interpreted:  MRI brain without contrast:  No acute intracranial abnormality    Carotid ultrasound:   No significant stenosis on the right.  On the left there is 50-69% diameter stenosis based on peak systolic velocity and systolic ratio.    TTE:    Left Ventricle: The left ventricle is normal in size. Mildly increased wall thickness. There is eccentric hypertrophy. Normal wall motion. There is low normal systolic function with a visually estimated ejection fraction of 50 - 55%. Grade I diastolic dysfunction.    Right Ventricle: Normal right ventricular cavity size. Systolic function is normal.    Left Atrium: Left atrium is moderately dilated.    Right Atrium: Right atrium is mildly dilated.    Aortic Valve: There is moderate stenosis. Aortic valve area by VTI is 1.16 cm². Aortic valve peak velocity is 3.54 m/s. Mean gradient is 28 mmHg. The dimensionless index is 0.36.    Mitral Valve: There is mild regurgitation.    Tricuspid Valve: There is mild regurgitation.    Pulmonary Artery: The estimated pulmonary artery systolic pressure is 40 mmHg.    IVC/SVC: Elevated venous pressure at 15 mmHg.    Bubble study negative with and without Valsalva       Assessment and Plan:   #gait instability  68 y.o. year old male with past medical history of tobacco  abuse, alcohol abuse, COPD presented by EMS to the ER yesterday after having multiple falls at home and instability on his feet.  UDS positive for amphetamines and marijuana.  Patient endorses alcohol intake everyday but alcohol level was negative in the ER.  MRI is negative for stroke.  Patient on exam is sedated due to benzodiazepine and appears to be going through alcohol withdrawal.  No jonathan focal neurological deficits noted on exam and concern for this being a vascular event is low..  Possible gait abnormalities secondary to cerebellar ataxia from chronic alcohol intake.  Inconsistent sensory exam due to patient's mental status, can not rule out sensory ataxia from alcohol-related polyneuropathy.    Plan:  - Daily aspirin 81 mg   - continue on atorvastatin 80 mg daily   -Goal Parameters for TIA/stroke: LDL<70 mg/dl, HbA1C: <7.0 %,  SBP<130/80   - continue evaluating patient on CIWA score and treat with p.r.n. Ativan   - Recommend delirium precautions: Re-orient patient frequently and keep pt's whiteboard up to date with current day and team member's names. Keep shades open/room lit during day..Low light at night (close blinds at night). Low stimulation, minimize interruptions at night. Minimize use of restraints  -Encourage family to be at bedside. Avoid opiates, benzodiazepines, antihistamines, anticholinergics, hypnotics as much as possible  - follow up in Neurology Clinic post discharge for evaluation of gait      Thank you for your consult. I will sign off. Please contact us if you have any additional questions.    Time spent on this encounter: 90 minutes. This includes face to face time and non-face to face time preparing to see the patient (eg, review of tests), obtaining and/or reviewing separately obtained history, documenting clinical information in the electronic or other health record, independently interpreting results and communicating results to the patient/family/caregiver, or care coordinator.      Scottie Jackson MD  Neurology  [facility]

## 2023-12-08 NOTE — HPI
68M with pmh of tobacco abuse, alcohol abuse, copd who presents via ems after stating he had multiple falls at home and instability on his feet. Pt denies loss of bowel/bladder, fever, chills, sob, abd pain, n/v, saddle parasthesias, cp, ha. Pt does endorse drinking beer daily. Pt also c/o of some right sided rib pain that is tender to palpitations. In ed pt work up unremarkable including negative ct head, alchol level, cbc, and cmp. Pt does have noted UDS positive for marijuana and amphetamines.

## 2023-12-08 NOTE — PHARMACY MED REC
"Admission Medication History     The home medication history was taken by Marylu Duffy CPhT.    You may go to "Admission" then "Reconcile Home Medications" tabs to review and/or act upon these items.     The home medication list has been updated by the Pharmacy department.   Please read ALL comments highlighted in yellow.   Please address this information as you see fit.    Feel free to contact us if you have any questions or require assistance.      The medications listed below were removed from the home medication list. Please reorder if appropriate:  Patient reports no longer taking the following medication(s):  Spiriva 18 mcg  Neurontin 100 mg tab  Folic acid 1 mg tab  Melatin 3 mg tab  Ecotrin 81 mg tab  Synthroid 50 mcg  Lidoderm 5 %    Medications listed below were obtained from: Patient/family and [employer] [facility]  (Not in a hospital admission)          Marylu Duffy CPhT.  [phone]                  .          "

## 2023-12-08 NOTE — PLAN OF CARE
Problem: Occupational Therapy  Goal: Occupational Therapy Goal  Description: Goals to be met by: 12/22/2023     Patient will increase functional independence with ADLs by performing:    UE Dressing with Modified [place].  LE Dressing with Modified [place].  Grooming while standing at sink with Modified [place].  Toileting from toilet with Modified [place] for hygiene and clothing management.   Rolling to Bilateral with Modified [place].   Supine to sit with Modified [place].  Step transfer with Modified [place]  Toilet transfer to toilet with Modified [place].  Upper extremity exercise program x15 reps per handout, with independence.    Outcome: Ongoing, Progressing

## 2023-12-08 NOTE — ED NOTES
MD at bedside assessing Pt. Pt now reporting he activated EMS for generalized weakness and frequent falls ongoing since last ED visit a few months ago. Pt not specifying whether or not he actually fell today just that this has been going on since last seen in ED. Denies any injuries, hitting head, or LOC, no obvious deformities observed. Pt reports wife noticed that he had some slurred speech yesterday, endorses daily alcohol consumption but won't specify how much, denies drug use other than marijuana. Reports 6/10 frontal HA -urinary s/s, SOB, CP, or any s/s

## 2023-12-08 NOTE — ED NOTES
Patient urinated on self, changed bed sheets reviewed POC with patient BSSS preformed patient tolerated well.

## 2023-12-08 NOTE — CONSULTS
Food & Nutrition  Education    Diet Education: DASH diet    Learners: Adrien Mckeon      Nutrition Education provided with handouts: Low Salt/ Fluid Restrictive Diet      Comments: RD consult for education on cardiac diet. Pt unable to be educated in ED. Educations attached to pt chart for in person follow up with onsite RD    Please re-consult as needed!    Thanks!     Patricia Diaz, Registration Eligible, Provisional LDN

## 2023-12-08 NOTE — ED TRIAGE NOTES
"Patient presents to the ED via EMS for initial c/o bilateral flank pain and BLE edema. Upon coming into room to assess Pt, Pt snoring and only responding to constant stimulation. + for slurred speech. Pt aroused enough for him to verbalize that he denies taking drugs and that he only had "2 16oz beers." Despite multiple attempts to keep Pt alert to determine cc, Pt just keeps mumbling "want to sleep" as he actively falls asleep w/ snoring respirations. 3+ BLE noted    "

## 2023-12-08 NOTE — H&P
[facility]  History & Physical    Patient Name: Adrien Mckeon  MRN: [id number]  Patient Class: IP- Inpatient  Admission Date: 12/7/2023  Attending Physician: Justin Stevens III, MD   Primary Care Provider: Heraclio Sims MD         Patient information was obtained from patient and ER records.     Subjective:     Principal Problem:Difficulty walking    Chief Complaint:   Chief Complaint   Patient presents with    Flank Pain     Patient arrives via EMS for bilateral flank pain, ongoing for the past 4 hours. Also having pitting edema to both legs, ongoing for same amount of time. Denies SOB.         HPI: 68M with pmh of tobacco abuse, alcohol abuse, copd who presents via ems after stating he had multiple falls at home and instability on his feet. Pt denies loss of bowel/bladder, fever, chills, sob, abd pain, n/v, saddle parasthesias, cp, ha. Pt does endorse drinking beer daily. Pt also c/o of some right sided rib pain that is tender to palpitations. In ed pt work up unremarkable including negative ct head, alchol level, cbc, and cmp. Pt does have noted UDS positive for marijuana and amphetamines.     Past Medical History:   Diagnosis Date    Arthritis     COPD (chronic obstructive pulmonary disease)     Hypertension     Prostate disorder     Thyroid disease        Past Surgical History:   Procedure Laterality Date    HAND SURGERY      left hand       Review of patient's allergies indicates:  No Known Allergies    No current facility-administered medications on file prior to encounter.     Current Outpatient Medications on File Prior to Encounter   Medication Sig    alfuzosin (UROXATRAL) 10 mg Tb24 Take 10 mg by mouth daily with breakfast.    amLODIPine (NORVASC) 10 MG tablet Take 10 mg by mouth once daily.    clonazePAM (KLONOPIN) 2 MG Tab Take 1 tablet by mouth 3 (three) times daily.    DROPSAFE ALCOHOL PREP PADS PadM Apply topically.    gabapentin (NEURONTIN) 300 MG capsule Take  300 mg by mouth 3 (three) times daily.    levothyroxine (SYNTHROID) 75 MCG tablet Take 75 mcg by mouth before breakfast.    sertraline (ZOLOFT) 50 MG tablet Take 3 tablets (150 mg total) by mouth once daily.    TRUE METRIX AIR GLUCOSE METER Mis     TRUE METRIX GLUCOSE TEST STRIP Strp 1 strip 3 (three) times daily.    TRUEPLUS LANCETS 33 gauge Misc Apply topically 3 (three) times daily.    albuterol (PROVENTIL) 2.5 mg /3 mL (0.083 %) nebulizer solution SMARTSI Vial(s) Via Nebulizer    albuterol (PROVENTIL/VENTOLIN HFA) 90 mcg/actuation inhaler Inhale 1-2 puffs into the lungs every 6 (six) hours as needed. Rescue    [DISCONTINUED] aspirin (ECOTRIN) 81 MG EC tablet Take 81 mg by mouth once daily.    [DISCONTINUED] folic acid (FOLVITE) 1 MG tablet Take 1 tablet (1 mg total) by mouth once daily.    [DISCONTINUED] gabapentin (NEURONTIN) 100 MG capsule Take 1 capsule (100 mg total) by mouth 3 (three) times daily. for 15 days    [DISCONTINUED] levothyroxine (SYNTHROID) 50 MCG tablet Take 1 tablet (50 mcg total) by mouth before breakfast.    [DISCONTINUED] LIDOcaine (LIDODERM) 5 % Place 1 patch onto the skin once daily. Remove & Discard patch within 12 hours or as directed by MD    [DISCONTINUED] melatonin (MELATIN) 3 mg tablet Take 1 tablet (3 mg total) by mouth daily with dinner or evening meal.    [DISCONTINUED] tiotropium (SPIRIVA) 18 mcg inhalation capsule Inhale 18 mcg into the lungs once daily. Controller     Family History       Problem Relation (Age of Onset)    Cancer Mother, Father, Sister    Heart disease Father          Tobacco Use    Smoking status: Every Day     Current packs/day: 1.50     Types: Cigarettes    Smokeless tobacco: Never   Substance and Sexual Activity    Alcohol use: Yes     Alcohol/week: 4.0 standard drinks of alcohol     Types: 4 Cans of beer per week     Comment: 8-10 beers a day    Drug use: Yes     Frequency: 7.0 times per week     Types: Marijuana, Methamphetamines     Comment: surinder  use 3 months ago    Sexual activity: Not Currently     Review of Systems  Objective:     Vital Signs (Most Recent):  Temp: 98.2 °F (36.8 °C) (12/08/23 1400)  Pulse: 72 (12/08/23 1400)  Resp: 17 (12/08/23 1400)  BP: (!) 148/85 (12/08/23 1400)  SpO2: 96 % (12/08/23 1400) Vital Signs (24h Range):  Temp:  [98 °F (36.7 °C)-98.9 °F (37.2 °C)] 98.2 °F (36.8 °C)  Pulse:  [67-91] 72  Resp:  [11-22] 17  SpO2:  [90 %-98 %] 96 %  BP: (100-148)/(63-85) 148/85     Weight: 81.6 kg (180 lb)  Body mass index is 28.19 kg/m².     Physical Exam  Vitals reviewed.   Constitutional:       General: He is not in acute distress.     Appearance: He is ill-appearing (chronic). He is not diaphoretic.   HENT:      Head: Normocephalic and atraumatic.      Mouth/Throat:      Mouth: Mucous membranes are dry.      Pharynx: Oropharynx is clear.   Eyes:      General: No scleral icterus.     Extraocular Movements: Extraocular movements intact.   Cardiovascular:      Rate and Rhythm: Normal rate and regular rhythm.   Pulmonary:      Effort: Pulmonary effort is normal. No respiratory distress (on RA).   Abdominal:      General: There is no distension.      Palpations: Abdomen is soft.      Tenderness: There is no abdominal tenderness. There is no guarding or rebound.   Musculoskeletal:         General: No tenderness.      Cervical back: Neck supple. No rigidity or tenderness.   Skin:     General: Skin is warm and dry.   Neurological:      General: No focal deficit present.      Mental Status: He is alert and oriented to person, place, and time.      Cranial Nerves: No cranial nerve deficit.      Sensory: No sensory deficit.      Motor: No weakness (moving all extremities. BLE strength grossly intact.).   Psychiatric:         Mood and Affect: Mood normal.         Behavior: Behavior normal.                Significant Labs: All pertinent labs within the past 24 hours have been reviewed.    Significant Imaging: I have reviewed all pertinent imaging  results/findings within the past 24 hours.  Assessment/Plan:     Wernickes encephalopathy  Concern in ed for Wernickes which is certainly a possibility with thiamine level ordered in ed. Pt does admit to not taking his vitamins at home lately. Pt also with gait issues as well as encephalopathy with otherwise negative work up.    -continue iv thiamine and b complex  -pt/ot  -neurology consulted.     * Difficulty walking  Pt presenting with instability and difficulty walking with multiple falls at home. ED concerned for possible wernickes vs CVA. Stroke pathway ordered and MRI without infarct noted. Continuing on IV thiamine for possible wernickes. Neurology consulted. Appreciate recommendations.   Antithrombotics for secondary stroke prevention: Antiplatelets: Aspirin: 81 mg daily    Statins for secondary stroke prevention and hyperlipidemia, if present:   Statins: Atorvastatin- 40 mg daily    Aggressive risk factor modification: Smoking, Diet     Rehab efforts: The patient has been evaluated by a stroke team provider and the therapy needs have been fully considered based off the presenting complaints and exam findings. The following therapy evaluations are needed: PT evaluate and treat, OT evaluate and treat, SLP evaluate and treat    Diagnostics ordered/pending: Carotid ultrasound to assess vasculature, CT scan of head without contrast to asses brain parenchyma, HgbA1C to assess blood glucose levels, Lipid Profile to assess cholesterol levels, MRI head without contrast to assess brain parenchyma, TTE to assess cardiac function/status , TSH to assess thyroid function    VTE prophylaxis: Heparin 5000 units SQ every 8 hours  Mechanical prophylaxis: Place SCDs    BP parameters: MRI negative.       Slurred speech  Work up as stated above.      Cigarette nicotine dependence without complication  Dangers of cigarette smoking were reviewed with patient in detail. Patient was Counseled for 3-10 minutes. Nicotine replacement  options were discussed. Nicotine replacement was discussed- not prescribed per patient's request    Chronic obstructive pulmonary disease  Patient's COPD is controlled currently.  Patient is currently off COPD Pathway. Continue scheduled inhalers and monitor respiratory status closely.     Acquired hypothyroidism  Restart home medications. TSH pending      Alcohol abuse  PT with known history of alcohol use. Starting on ciwa with valium scheduled. Monitor for withdrawals and prn ativan ordered. Replacing vitamins.        VTE Risk Mitigation (From admission, onward)           Ordered     IP VTE LOW RISK PATIENT  Once         12/08/23 0750     Place sequential compression device  Until discontinued         12/08/23 0750                               AdmissionCare    Guideline: Neurology, Inpatient    Based on the indications selected for the patient, the bed status of Admit to Inpatient was determined to be MET    The following indications were selected as present at the time of evaluation of the patient:      Mental status change requiring inpatient care, as indicated by 1 or more of the following:   -     - Etiology suspected that requires treatment beyond observation care time frame (eg, Wernicke encephalopathy)    AdmissionCare documentation entered by: Tonya Garcia    [facility], 27th edition, Copyright © 2023 [facility] [employer], [facility] All Rights Reserved.  [id number]    Justin Stevens III, MD  Department of Hospital Medicine  [facility] - Emergency Dept

## 2023-12-08 NOTE — ED PROVIDER NOTES
"Encounter Date: 12/7/2023    SCRIBE #1 NOTE: I, Kevin Nelson, am scribing for, and in the presence of,  Lan Rene MD.       History     Chief Complaint   Patient presents with    Flank Pain     Patient arrives via EMS for bilateral flank pain, ongoing for the past 4 hours. Also having pitting edema to both legs, ongoing for same amount of time. Denies SOB.      Adrien Mckeon is a 68 y.o. male with a PMHx of COPD, HTN, tobacco abuse, alcohol abuse, who presents to the ED via EMS for initial complain of flank pain. History provided by independent historian, EMS, reports initial call for bilateral flank pain for 4 hours. He currently states he "couldn't control himself," and that he has been falling multiple times. He states this has been ongoing for 2-3 weeks. He is actively falling asleep. He does report EtOH use, noting he had 2 beers today, but no liquor. He is also a smoker. He denies any chest pain, shortness of breath, abdominal pain, or other associated symptoms at this time. NKDA.    The history is provided by the patient. No  was used.     Review of patient's allergies indicates:  No Known Allergies  Past Medical History:   Diagnosis Date    Arthritis     COPD (chronic obstructive pulmonary disease)     Hypertension     Prostate disorder     Thyroid disease      Past Surgical History:   Procedure Laterality Date    HAND SURGERY      left hand     Family History   Problem Relation Age of Onset    Cancer Mother     Cancer Father     Heart disease Father     Cancer Sister      Social History     Tobacco Use    Smoking status: Every Day     Current packs/day: 1.50     Types: Cigarettes    Smokeless tobacco: Never   Substance Use Topics    Alcohol use: Yes     Alcohol/week: 4.0 standard drinks of alcohol     Types: 4 Cans of beer per week     Comment: 8-10 beers a day    Drug use: Yes     Frequency: 7.0 times per week     Types: Marijuana, Methamphetamines     Comment: lasy use 3 " months ago     Review of Systems   Unable to perform ROS: Other (patient actively falling asleep)       Physical Exam     Initial Vitals [12/07/23 1943]   BP Pulse Resp Temp SpO2   125/77 91 (!) 22 98.3 °F (36.8 °C) 98 %      MAP       --         Physical Exam    Nursing note and vitals reviewed.  Constitutional: He appears well-developed and well-nourished. He appears lethargic. He is not diaphoretic. No distress.   HENT:   Head: Normocephalic and atraumatic.   Right Ear: External ear normal.   Left Ear: External ear normal.   Nose: Nose normal.   Eyes: Conjunctivae are normal. Pupils are equal, round, and reactive to light. No scleral icterus.   Pupils are 3 mm bilaterally. Mild nystagmus to the right.    Neck: Neck supple. No tracheal deviation present.   Cardiovascular:  Normal rate, regular rhythm and normal heart sounds.     Exam reveals no gallop and no friction rub.       No murmur heard.  Pulmonary/Chest: No respiratory distress. He has wheezes (mild, end-expiratory).   Abdominal: Abdomen is soft. Bowel sounds are normal. He exhibits distension (mild). There is no abdominal tenderness.   Musculoskeletal:      Cervical back: Neck supple.      Comments: No midline spinal tenderness to the cervical, thoracic, or lumbar vertebrae noted. There is tenderness to the lateral lumbar paraspinal musculature.      Neurological: He is oriented to person, place, and time. He has normal strength. He appears lethargic. No cranial nerve deficit. GCS score is 15. GCS eye subscore is 4. GCS verbal subscore is 5. GCS motor subscore is 6.   Slurred speech. On finger to nose, drifting when touching finger.    Skin: Skin is warm and dry.   No ecchymosis noted.   Psychiatric: He has a normal mood and affect. Thought content normal.         ED Course   Procedures  Labs Reviewed   CBC W/ AUTO DIFFERENTIAL - Abnormal; Notable for the following components:       Result Value    RBC 3.92 (*)     Hemoglobin 11.6 (*)     Hematocrit 35.6  (*)     All other components within normal limits   DRUG SCREEN PANEL, URINE EMERGENCY - Abnormal; Notable for the following components:    Amphetamine Screen, Ur Presumptive Positive (*)     THC Presumptive Positive (*)     All other components within normal limits    Narrative:     Specimen Source->Urine   ACETAMINOPHEN LEVEL - Abnormal; Notable for the following components:    Acetaminophen (Tylenol), Serum <3.0 (*)     All other components within normal limits   SALICYLATE LEVEL - Abnormal; Notable for the following components:    Salicylate Lvl <5.0 (*)     All other components within normal limits   COMPREHENSIVE METABOLIC PANEL   LIPASE   URINALYSIS, REFLEX TO URINE CULTURE    Narrative:     Specimen Source->Urine   ALCOHOL,MEDICAL (ETHANOL)   MAGNESIUM   ALCOHOL,MEDICAL (ETHANOL)   MAGNESIUM   AMMONIA   B-TYPE NATRIURETIC PEPTIDE   TROPONIN I   ACETAMINOPHEN LEVEL   SALICYLATE LEVEL   VITAMIN B1   POCT GLUCOSE MONITORING CONTINUOUS          Imaging Results              CT Head Without Contrast (Final result)  Result time 12/07/23 23:36:07      Final result by Dalia Mena MD (12/07/23 23:36:07)                   Impression:      No acute intracranial abnormality detected.  No significant change.      Electronically signed by: Dalia Mena  Date:    12/07/2023  Time:    23:36               Narrative:    EXAMINATION:  CT OF THE HEAD WITHOUT    CLINICAL HISTORY:  Mental status change, unknown cause;    TECHNIQUE:  5 mm unenhanced axial images were obtained from the skull base to the vertex.    COMPARISON:  07/06/2023    FINDINGS:  The ventricles, basal cisterns, and cortical sulci are within normal limits for patient's stated age.  Stable nonacute lacunar infarcts are seen in bilateral basal ganglia.  There is no acute intracranial hemorrhage, territorial infarct or mass effect, or midline shift. In the visualized paranasal sinuses and mastoid air cells, there is mild bilateral maxillary sinus  mucoperiosteal thickening and a left frontal sinus osteoma.  Advanced vascular calcifications are seen involving the supraclinoid portions of bilateral internal carotid arteries.                                       X-Ray Chest AP Portable (Final result)  Result time 12/07/23 22:31:22      Final result by Dalia Mena MD (12/07/23 22:31:22)                   Impression:      No acute intrathoracic abnormality detected.      Electronically signed by: Dalia Mena  Date:    12/07/2023  Time:    22:31               Narrative:    EXAMINATION:  AP PORTABLE CHEST    CLINICAL HISTORY:  AMS;    TECHNIQUE:  AP portable chest radiograph was submitted.    COMPARISON:  07/06/2023    FINDINGS:  AP portable chest radiograph demonstrates a cardiac silhouette within normal limits.  Vascular calcification is seen at the aortic knob.  There is no focal consolidation, pneumothorax, or pleural effusion.  There are remote left posterior rib fractures.                                       Medications   sodium chloride 0.9% 1,000 mL with mvi, (ADULT) no.4 with vit K 3,300 unit- 150 mcg/10 mL 10 mL, thiamine 100 mg, folic acid 1 mg infusion ( Intravenous New Bag 12/7/23 2223)   thiamine injection 400 mg (400 mg Intravenous Given 12/7/23 2223)   albuterol-ipratropium 2.5 mg-0.5 mg/3 mL nebulizer solution 3 mL (3 mLs Nebulization Given 12/7/23 2235)   LORazepam injection 1 mg (1 mg Intravenous Given 12/7/23 2343)     Medical Decision Making  Amount and/or Complexity of Data Reviewed  Labs: ordered.  Radiology: ordered. Decision-making details documented in ED Course.    Risk  Prescription drug management.  Decision regarding hospitalization.            Scribe Attestation:   Scribe #1: I performed the above scribed service and the documentation accurately describes the services I performed. I attest to the accuracy of the note.        ED Course as of 12/08/23 0043   Thu Dec 07, 2023   4339 X-Ray Chest AP Portable  FINDINGS:  AP  "portable chest radiograph demonstrates a cardiac silhouette within normal limits.  Vascular calcification is seen at the aortic knob.  There is no focal consolidation, pneumothorax, or pleural effusion.  There are remote left posterior rib fractures.     Impression:     No acute intrathoracic abnormality detected.   [CC]   Fri Dec 08, 2023   0004 CT Head Without Contrast  Impression:     No acute intracranial abnormality detected.  No significant change.         [CC]   0040 60-year-old male presents to the emergency department for evaluation of fall, lower back pain, slurred speech.  Patient initially appeared intoxicated.  He has a history of alcohol abuse.  Alcohol level is negative.  Nystagmus noted, concern for Wernicke's encephalopathy.  Patient given 500 mg thiamine as well as banana bag.  No meningismus.  CT of the head is negative for any acute intracranial abnormalities including intracranial mass, intracranial hemorrhage.  Doubt these etiologies.  He is not febrile, no leukocytosis.  Mild anemia noted but not to level concerning for symptomatic anemia.  Platelet count is normal.  Electrolytes within normal limits, kidney function normal, doubt uremic encephalopathy.  Doubt electrolyte derangement.  Liver functions within normal limits.  Ammonia level is normal.  Hepatic encephalopathy is less likely.  Slurred speech noted.  He notes symptoms initially for "weeks".  He then told the nurse he woke up 2 days ago with slurred speech.  I believe subacute CVA still in the differential.  Patient will likely need neurologic evaluation as well as MRI.  Ativan given for concern of possible delirium tremens.  Patient continues to be encephalopathic.  Chest x-ray is unremarkable, no evidence of pneumonia.  I believe sepsis is less likely.  I do not believe antibiotics are indicated.  I spoke with Hospital Medicine for admission. [CC]      ED Course User Index  [CC] Lan Rene MD                         I, " Lan Rene MD, personally performed the services described in this documentation. All medical record entries made by the scribe were at my direction and in my presence. I have reviewed the chart and agree that the record reflects my personal performance and is accurate and complete.    Clinical Impression:  Final diagnoses:  [F10.939] Alcohol withdrawal  [G93.40] Encephalopathy acute (Primary)          ED Disposition Condition    Admit                 Lan Rene MD  12/08/23 0043

## 2023-12-08 NOTE — NURSING
Nurses Note -- 4 Eyes      12/8/2023   3:39 PM      Skin assessed during: Admit      [x] No Altered Skin Integrity Present    []Prevention Measures Documented      [] Yes- Altered Skin Integrity Present or Discovered   [] LDA Added if Not in Epic (Describe Wound)   [] New Altered Skin Integrity was Present on Admit and Documented in LDA   [] Wound Image Taken    Wound Care Consulted? No    Attending Nurse:  Radha Henning RN/Staff Member:   MAX Mina

## 2023-12-08 NOTE — ED NOTES
Care assumed, patient in bed, eyes closed responds to verbal, states name and  is aware of time and place, slight slurring with voice noted, per night shift no change in speech pattern

## 2023-12-08 NOTE — ASSESSMENT & PLAN NOTE
Pt presenting with instability and difficulty walking with multiple falls at home. ED concerned for possible wernickes vs CVA. Stroke pathway ordered and MRI without infarct noted. Continuing on IV thiamine for possible wernickes. Neurology consulted. Appreciate recommendations.   Antithrombotics for secondary stroke prevention: Antiplatelets: Aspirin: 81 mg daily    Statins for secondary stroke prevention and hyperlipidemia, if present:   Statins: Atorvastatin- 40 mg daily    Aggressive risk factor modification: Smoking, Diet     Rehab efforts: The patient has been evaluated by a stroke team provider and the therapy needs have been fully considered based off the presenting complaints and exam findings. The following therapy evaluations are needed: PT evaluate and treat, OT evaluate and treat, SLP evaluate and treat    Diagnostics ordered/pending: Carotid ultrasound to assess vasculature, CT scan of head without contrast to asses brain parenchyma, HgbA1C to assess blood glucose levels, Lipid Profile to assess cholesterol levels, MRI head without contrast to assess brain parenchyma, TTE to assess cardiac function/status , TSH to assess thyroid function    VTE prophylaxis: Heparin 5000 units SQ every 8 hours  Mechanical prophylaxis: Place SCDs    BP parameters: MRI negative.

## 2023-12-08 NOTE — PLAN OF CARE
Problem: Physical Therapy  Goal: Physical Therapy Goal  Description: Goals to be met by: 23     Patient will increase functional independence with mobility by performin. Supine to sit with Modified [place]  2. Sit to stand transfer with Modified [place]  3. Gait  x 150 feet with Modified [place]    4  Ascend/descend 4 stair with right Handrails Modified [place]    5. Lower extremity exercise program x10 reps per handout, with supervision    Outcome: Ongoing, Progressing   Initial PT evaluation performed today.  Pt could benefit from skilled PT services 5-6x/wk in order to maximize function prior to D/C.  Low Intensity Therapy, Supervision, and RW recommended at time of D/C.

## 2023-12-08 NOTE — ASSESSMENT & PLAN NOTE
Concern in ed for Wernickes which is certainly a possibility with thiamine level ordered in ed. Pt does admit to not taking his vitamins at home lately. Pt also with gait issues as well as encephalopathy with otherwise negative work up.    -continue iv thiamine and b complex  -pt/ot  -neurology consulted.

## 2023-12-08 NOTE — ASSESSMENT & PLAN NOTE
Patient's COPD is controlled currently.  Patient is currently off COPD Pathway. Continue scheduled inhalers and monitor respiratory status closely.

## 2023-12-09 VITALS
OXYGEN SATURATION: 93 % | BODY MASS INDEX: 28.25 KG/M2 | HEART RATE: 76 BPM | SYSTOLIC BLOOD PRESSURE: 118 MMHG | DIASTOLIC BLOOD PRESSURE: 62 MMHG | TEMPERATURE: 98 F | RESPIRATION RATE: 20 BRPM | HEIGHT: 67 IN | WEIGHT: 180 LBS

## 2023-12-09 LAB
ALBUMIN SERPL BCP-MCNC: 3.5 G/DL (ref 3.5–5.2)
ALP SERPL-CCNC: 64 U/L (ref 55–135)
ALT SERPL W/O P-5'-P-CCNC: 12 U/L (ref 10–44)
ANION GAP SERPL CALC-SCNC: 13 MMOL/L (ref 8–16)
APTT PPP: 29.1 SEC (ref 21–32)
AST SERPL-CCNC: 17 U/L (ref 10–40)
BASOPHILS # BLD AUTO: 0.02 K/UL (ref 0–0.2)
BASOPHILS NFR BLD: 0.3 % (ref 0–1.9)
BILIRUB SERPL-MCNC: 0.6 MG/DL (ref 0.1–1)
BUN SERPL-MCNC: 10 MG/DL (ref 8–23)
CALCIUM SERPL-MCNC: 9.1 MG/DL (ref 8.7–10.5)
CHLORIDE SERPL-SCNC: 102 MMOL/L (ref 95–110)
CO2 SERPL-SCNC: 25 MMOL/L (ref 23–29)
CREAT SERPL-MCNC: 0.8 MG/DL (ref 0.5–1.4)
DIFFERENTIAL METHOD: ABNORMAL
EOSINOPHIL # BLD AUTO: 0.1 K/UL (ref 0–0.5)
EOSINOPHIL NFR BLD: 2 % (ref 0–8)
ERYTHROCYTE [DISTWIDTH] IN BLOOD BY AUTOMATED COUNT: 12.3 % (ref 11.5–14.5)
EST. GFR  (NO RACE VARIABLE): >60 ML/MIN/1.73 M^2
GLUCOSE SERPL-MCNC: 99 MG/DL (ref 70–110)
HCT VFR BLD AUTO: 37.9 % (ref 40–54)
HGB BLD-MCNC: 12.3 G/DL (ref 14–18)
IMM GRANULOCYTES # BLD AUTO: 0.02 K/UL (ref 0–0.04)
IMM GRANULOCYTES NFR BLD AUTO: 0.3 % (ref 0–0.5)
INR PPP: 1 (ref 0.8–1.2)
LYMPHOCYTES # BLD AUTO: 2.2 K/UL (ref 1–4.8)
LYMPHOCYTES NFR BLD: 30.8 % (ref 18–48)
MAGNESIUM SERPL-MCNC: 2.1 MG/DL (ref 1.6–2.6)
MCH RBC QN AUTO: 29.7 PG (ref 27–31)
MCHC RBC AUTO-ENTMCNC: 32.5 G/DL (ref 32–36)
MCV RBC AUTO: 92 FL (ref 82–98)
MONOCYTES # BLD AUTO: 1.1 K/UL (ref 0.3–1)
MONOCYTES NFR BLD: 15.2 % (ref 4–15)
NEUTROPHILS # BLD AUTO: 3.6 K/UL (ref 1.8–7.7)
NEUTROPHILS NFR BLD: 51.4 % (ref 38–73)
NRBC BLD-RTO: 0 /100 WBC
PLATELET # BLD AUTO: 221 K/UL (ref 150–450)
PMV BLD AUTO: 12 FL (ref 9.2–12.9)
POTASSIUM SERPL-SCNC: 3.6 MMOL/L (ref 3.5–5.1)
PROT SERPL-MCNC: 6.8 G/DL (ref 6–8.4)
PROTHROMBIN TIME: 10.4 SEC (ref 9–12.5)
RBC # BLD AUTO: 4.14 M/UL (ref 4.6–6.2)
SODIUM SERPL-SCNC: 140 MMOL/L (ref 136–145)
WBC # BLD AUTO: 7.02 K/UL (ref 3.9–12.7)

## 2023-12-09 PROCEDURE — 80053 COMPREHEN METABOLIC PANEL: CPT | Performed by: STUDENT IN AN ORGANIZED HEALTH CARE EDUCATION/TRAINING PROGRAM

## 2023-12-09 PROCEDURE — 83735 ASSAY OF MAGNESIUM: CPT | Performed by: STUDENT IN AN ORGANIZED HEALTH CARE EDUCATION/TRAINING PROGRAM

## 2023-12-09 PROCEDURE — 36415 COLL VENOUS BLD VENIPUNCTURE: CPT | Performed by: STUDENT IN AN ORGANIZED HEALTH CARE EDUCATION/TRAINING PROGRAM

## 2023-12-09 PROCEDURE — 63600175 PHARM REV CODE 636 W HCPCS: Performed by: STUDENT IN AN ORGANIZED HEALTH CARE EDUCATION/TRAINING PROGRAM

## 2023-12-09 PROCEDURE — 25000003 PHARM REV CODE 250: Performed by: STUDENT IN AN ORGANIZED HEALTH CARE EDUCATION/TRAINING PROGRAM

## 2023-12-09 PROCEDURE — 85730 THROMBOPLASTIN TIME PARTIAL: CPT | Performed by: STUDENT IN AN ORGANIZED HEALTH CARE EDUCATION/TRAINING PROGRAM

## 2023-12-09 PROCEDURE — 85610 PROTHROMBIN TIME: CPT | Performed by: STUDENT IN AN ORGANIZED HEALTH CARE EDUCATION/TRAINING PROGRAM

## 2023-12-09 PROCEDURE — 85025 COMPLETE CBC W/AUTO DIFF WBC: CPT | Performed by: STUDENT IN AN ORGANIZED HEALTH CARE EDUCATION/TRAINING PROGRAM

## 2023-12-09 RX ORDER — VITAMIN B COMPLEX
1 CAPSULE ORAL DAILY
Qty: 30 CAPSULE | Refills: 0 | Status: SHIPPED | OUTPATIENT
Start: 2023-12-09

## 2023-12-09 RX ORDER — LANOLIN ALCOHOL/MO/W.PET/CERES
100 CREAM (GRAM) TOPICAL DAILY
Qty: 30 TABLET | Refills: 0 | Status: SHIPPED | OUTPATIENT
Start: 2023-12-09

## 2023-12-09 RX ADMIN — DIAZEPAM 5 MG: 5 TABLET ORAL at 06:12

## 2023-12-09 RX ADMIN — ASPIRIN 81 MG: 81 TABLET, COATED ORAL at 08:12

## 2023-12-09 RX ADMIN — THIAMINE HYDROCHLORIDE 500 MG: 100 INJECTION, SOLUTION INTRAMUSCULAR; INTRAVENOUS at 06:12

## 2023-12-09 RX ADMIN — SERTRALINE HYDROCHLORIDE 150 MG: 50 TABLET ORAL at 08:12

## 2023-12-09 RX ADMIN — LEVOTHYROXINE SODIUM 50 MCG: 50 TABLET ORAL at 06:12

## 2023-12-09 NOTE — NURSING
Patient requesting to leave AMA. Dr. Stevens at bedside discussing risks about leaving without medical clearance. Patient still requesting to leave. AMA paperwork signed and filled out.

## 2023-12-13 LAB — VIT B1 BLD-MCNC: 69 UG/L (ref 38–122)

## 2023-12-15 NOTE — HOSPITAL COURSE
68M with pmh of tobacco abuse, alcohol abuse, copd who presents via ems after stating he had multiple falls at home and instability on his feet.  In ed pt work up unremarkable including negative ct head, alchol level, cbc, and cmp. Pt does have noted UDS positive for marijuana and amphetamines. Etiology of falls seemed likely related to alcohol use. Pt admitted on thiamine iv and valium taper with ciwa. PT/OT/Neuro consulted. MRI negative for stroke. On the following morning pt requesting to leave. I went to bedside to discuss. Pt a&ox3 and able to comprehend risks of leaving. I discussed concerns that he could have worsening in ambulation, confusion, and even death if he leaves the hospital prior to completion of tx. Pt was able to explain the risks back to me and still stated he would like to d/c home. Pt already on klonopin 2 mg tid at home on top of alcohol use. I advised that he should work with his physician to taper these. I did send pt b complex and thiamine vitamins in hopes of helping with his tx.

## 2023-12-15 NOTE — DISCHARGE SUMMARY
[facility] Medicine  Discharge Summary      Patient Name: Adrien Mckeon  MRN: [id number]  BRYSON: [id number]  Patient Class: IP- Inpatient  Admission Date: 12/7/2023  Hospital Length of Stay: 1 days  Discharge Date and Time: 12/9/2023 10:00 AM  Attending Physician: No att. providers found   Discharging Provider: Justin Stevens III, MD  Primary Care Provider: Heraclio Sims MD    Primary Care Team: Networked reference to record PCT     HPI:   68M with pmh of tobacco abuse, alcohol abuse, copd who presents via ems after stating he had multiple falls at home and instability on his feet. Pt denies loss of bowel/bladder, fever, chills, sob, abd pain, n/v, saddle parasthesias, cp, ha. Pt does endorse drinking beer daily. Pt also c/o of some right sided rib pain that is tender to palpitations. In ed pt work up unremarkable including negative ct head, alchol level, cbc, and cmp. Pt does have noted UDS positive for marijuana and amphetamines.     * No surgery found *      Hospital Course:   68M with pmh of tobacco abuse, alcohol abuse, copd who presents via ems after stating he had multiple falls at home and instability on his feet.  In ed pt work up unremarkable including negative ct head, alchol level, cbc, and cmp. Pt does have noted UDS positive for marijuana and amphetamines. Etiology of falls seemed likely related to alcohol use. Pt admitted on thiamine iv and valium taper with ciwa. PT/OT/Neuro consulted. MRI negative for stroke. On the following morning pt requesting to leave. I went to bedside to discuss. Pt a&ox3 and able to comprehend risks of leaving. I discussed concerns that he could have worsening in ambulation, confusion, and even death if he leaves the hospital prior to completion of tx. Pt was able to explain the risks back to me and still stated he would like to d/c home. Pt already on klonopin 2 mg tid at home on top of alcohol use. I advised that he should work with his physician to  taper these. I did send pt b complex and thiamine vitamins in hopes of helping with his tx.      Goals of Care Treatment Preferences:  Code Status: Full Code      Consults:   Consults (From admission, onward)          Status Ordering Provider     Inpatient consult to Registered Dietitian/Nutritionist  Once        Provider:  (Not yet assigned)    RADU Perea III     Inpatient consult to Neurology  Once        Provider:  Scottie Jackson MD    Completed RADU ROBLEDO III            Neuro  Wernickes encephalopathy  Concern in ed for Wernickes which is certainly a possibility with thiamine level ordered in ed. Pt does admit to not taking his vitamins at home lately. Pt also with gait issues as well as encephalopathy with otherwise negative work up.    -continue iv thiamine and b complex  -pt/ot  -neurology consulted.         Final Active Diagnoses:    Diagnosis Date Noted POA    PRINCIPAL PROBLEM:  Difficulty walking [R26.2] 12/08/2023 Yes    Slurred speech [R47.81] 12/08/2023 Yes    Wernickes encephalopathy [E51.2] 12/08/2023 Yes    Cigarette nicotine dependence without complication [F17.210] 08/31/2022 Yes    Acquired hypothyroidism [E03.9] 06/03/2019 Yes    Alcohol abuse [F10.10] 11/08/2018 Yes    Chronic obstructive pulmonary disease [J44.9] 10/24/2018 Yes      Problems Resolved During this Admission:    Diagnosis Date Noted Date Resolved POA    Stroke [I63.9] 12/08/2023 12/08/2023 Yes       Discharged Condition: poor    Disposition: Left Against Medical Adv*    Follow Up:    Patient Instructions:   No discharge procedures on file.    Significant Diagnostic Studies: Labs: All labs within the past 24 hours have been reviewed    Pending Diagnostic Studies:       None           Medications:  Reconciled Home Medications:      Medication List        START taking these medications      b complex vitamins capsule  Take 1 capsule by mouth once daily.     thiamine 100 MG tablet  Take 1 tablet (100 mg total)  by mouth once daily.            ASK your doctor about these medications      * albuterol 90 mcg/actuation inhaler  Commonly known as: PROVENTIL/VENTOLIN HFA  Inhale 1-2 puffs into the lungs every 6 (six) hours as needed. Rescue     * albuterol 2.5 mg /3 mL (0.083 %) nebulizer solution  Commonly known as: PROVENTIL  SMARTSI Vial(s) Via Nebulizer     alfuzosin 10 mg Tb24  Commonly known as: UROXATRAL  Take 10 mg by mouth daily with breakfast.     amLODIPine 10 MG tablet  Commonly known as: NORVASC  Take 10 mg by mouth once daily.     clonazePAM 2 MG Tab  Commonly known as: KlonoPIN  Take 1 tablet by mouth 3 (three) times daily.     DROPSAFE ALCOHOL PREP PADS Padm  Generic drug: alcohol swabs  Apply topically.     gabapentin 300 MG capsule  Commonly known as: NEURONTIN  Take 300 mg by mouth 3 (three) times daily.  Ask about: Which instructions should I use?     levothyroxine 75 MCG tablet  Commonly known as: SYNTHROID  Take 75 mcg by mouth before breakfast.  Ask about: Which instructions should I use?     sertraline 50 MG tablet  Commonly known as: ZOLOFT  Take 3 tablets (150 mg total) by mouth once daily.     TRUE METRIX AIR GLUCOSE METER Misc  Generic drug: blood-glucose meter     TRUE METRIX GLUCOSE TEST STRIP Strp  Generic drug: blood sugar diagnostic  1 strip 3 (three) times daily.     TRUEPLUS LANCETS 33 gauge Misc  Generic drug: lancets  Apply topically 3 (three) times daily.           * This list has 2 medication(s) that are the same as other medications prescribed for you. Read the directions carefully, and ask your doctor or other care provider to review them with you.                  Indwelling Lines/Drains at time of discharge:   Lines/Drains/Airways       None                   Time spent on the discharge of patient: >35 minutes         Justin Stevens III, MD  Department of [facility] Medicine  [facility]

## 2025-01-04 ENCOUNTER — HOSPITAL ENCOUNTER (EMERGENCY)
Facility: HOSPITAL | Age: 70
Discharge: PSYCHIATRIC OR SUBSTANCE USE FACILITY | End: 2025-01-04
Attending: STUDENT IN AN ORGANIZED HEALTH CARE EDUCATION/TRAINING PROGRAM
Payer: MEDICARE

## 2025-01-04 VITALS
WEIGHT: 165 LBS | TEMPERATURE: 98 F | HEIGHT: 67 IN | RESPIRATION RATE: 18 BRPM | BODY MASS INDEX: 25.9 KG/M2 | DIASTOLIC BLOOD PRESSURE: 84 MMHG | OXYGEN SATURATION: 98 % | SYSTOLIC BLOOD PRESSURE: 125 MMHG | HEART RATE: 91 BPM

## 2025-01-04 DIAGNOSIS — R45.851 SUICIDAL IDEATION: Primary | ICD-10-CM

## 2025-01-04 DIAGNOSIS — F15.929 METHAMPHETAMINE INTOXICATION: ICD-10-CM

## 2025-01-04 LAB
ALBUMIN SERPL BCP-MCNC: 4.4 G/DL (ref 3.5–5.2)
ALP SERPL-CCNC: 92 U/L (ref 40–150)
ALT SERPL W/O P-5'-P-CCNC: 14 U/L (ref 10–44)
AMPHET+METHAMPHET UR QL: ABNORMAL
ANION GAP SERPL CALC-SCNC: 12 MMOL/L (ref 8–16)
APAP SERPL-MCNC: <3 UG/ML (ref 10–20)
AST SERPL-CCNC: 16 U/L (ref 10–40)
BARBITURATES UR QL SCN>200 NG/ML: NEGATIVE
BASOPHILS # BLD AUTO: 0.02 K/UL (ref 0–0.2)
BASOPHILS NFR BLD: 0.2 % (ref 0–1.9)
BENZODIAZ UR QL SCN>200 NG/ML: ABNORMAL
BILIRUB SERPL-MCNC: 0.4 MG/DL (ref 0.1–1)
BILIRUB UR QL STRIP: NEGATIVE
BUN SERPL-MCNC: 9 MG/DL (ref 8–23)
BZE UR QL SCN: NEGATIVE
CALCIUM SERPL-MCNC: 9.4 MG/DL (ref 8.7–10.5)
CANNABINOIDS UR QL SCN: ABNORMAL
CHLORIDE SERPL-SCNC: 103 MMOL/L (ref 95–110)
CLARITY UR: CLEAR
CO2 SERPL-SCNC: 24 MMOL/L (ref 23–29)
COLOR UR: YELLOW
CREAT SERPL-MCNC: 0.9 MG/DL (ref 0.5–1.4)
CREAT UR-MCNC: 94.4 MG/DL (ref 23–375)
DIFFERENTIAL METHOD BLD: NORMAL
EOSINOPHIL # BLD AUTO: 0.2 K/UL (ref 0–0.5)
EOSINOPHIL NFR BLD: 1.9 % (ref 0–8)
ERYTHROCYTE [DISTWIDTH] IN BLOOD BY AUTOMATED COUNT: 12.6 % (ref 11.5–14.5)
EST. GFR  (NO RACE VARIABLE): >60 ML/MIN/1.73 M^2
ETHANOL SERPL-MCNC: <10 MG/DL
GLUCOSE SERPL-MCNC: 100 MG/DL (ref 70–110)
GLUCOSE UR QL STRIP: NEGATIVE
HCT VFR BLD AUTO: 43.9 % (ref 40–54)
HGB BLD-MCNC: 14.5 G/DL (ref 14–18)
HGB UR QL STRIP: NEGATIVE
IMM GRANULOCYTES # BLD AUTO: 0.03 K/UL (ref 0–0.04)
IMM GRANULOCYTES NFR BLD AUTO: 0.3 % (ref 0–0.5)
KETONES UR QL STRIP: NEGATIVE
LEUKOCYTE ESTERASE UR QL STRIP: NEGATIVE
LYMPHOCYTES # BLD AUTO: 3.1 K/UL (ref 1–4.8)
LYMPHOCYTES NFR BLD: 32 % (ref 18–48)
MCH RBC QN AUTO: 29.6 PG (ref 27–31)
MCHC RBC AUTO-ENTMCNC: 33 G/DL (ref 32–36)
MCV RBC AUTO: 90 FL (ref 82–98)
METHADONE UR QL SCN>300 NG/ML: NEGATIVE
MONOCYTES # BLD AUTO: 1 K/UL (ref 0.3–1)
MONOCYTES NFR BLD: 9.9 % (ref 4–15)
NEUTROPHILS # BLD AUTO: 5.4 K/UL (ref 1.8–7.7)
NEUTROPHILS NFR BLD: 55.7 % (ref 38–73)
NITRITE UR QL STRIP: NEGATIVE
NRBC BLD-RTO: 0 /100 WBC
OPIATES UR QL SCN: NEGATIVE
PCP UR QL SCN>25 NG/ML: NEGATIVE
PH UR STRIP: 6 [PH] (ref 5–8)
PLATELET # BLD AUTO: 243 K/UL (ref 150–450)
PMV BLD AUTO: 11.4 FL (ref 9.2–12.9)
POTASSIUM SERPL-SCNC: 4.6 MMOL/L (ref 3.5–5.1)
PROT SERPL-MCNC: 7.7 G/DL (ref 6–8.4)
PROT UR QL STRIP: NEGATIVE
RBC # BLD AUTO: 4.9 M/UL (ref 4.6–6.2)
SODIUM SERPL-SCNC: 139 MMOL/L (ref 136–145)
SP GR UR STRIP: 1.01 (ref 1–1.03)
T4 FREE SERPL-MCNC: 0.99 NG/DL (ref 0.71–1.51)
TOXICOLOGY INFORMATION: ABNORMAL
TSH SERPL DL<=0.005 MIU/L-ACNC: 6.55 UIU/ML (ref 0.4–4)
URN SPEC COLLECT METH UR: NORMAL
UROBILINOGEN UR STRIP-ACNC: NEGATIVE EU/DL
WBC # BLD AUTO: 9.72 K/UL (ref 3.9–12.7)

## 2025-01-04 PROCEDURE — 84439 ASSAY OF FREE THYROXINE: CPT | Performed by: STUDENT IN AN ORGANIZED HEALTH CARE EDUCATION/TRAINING PROGRAM

## 2025-01-04 PROCEDURE — 80143 DRUG ASSAY ACETAMINOPHEN: CPT | Performed by: STUDENT IN AN ORGANIZED HEALTH CARE EDUCATION/TRAINING PROGRAM

## 2025-01-04 PROCEDURE — 99285 EMERGENCY DEPT VISIT HI MDM: CPT

## 2025-01-04 PROCEDURE — 84443 ASSAY THYROID STIM HORMONE: CPT | Performed by: STUDENT IN AN ORGANIZED HEALTH CARE EDUCATION/TRAINING PROGRAM

## 2025-01-04 PROCEDURE — G0427 INPT/ED TELECONSULT70: HCPCS | Mod: GT,,, | Performed by: PSYCHIATRY & NEUROLOGY

## 2025-01-04 PROCEDURE — 80307 DRUG TEST PRSMV CHEM ANLYZR: CPT | Performed by: STUDENT IN AN ORGANIZED HEALTH CARE EDUCATION/TRAINING PROGRAM

## 2025-01-04 PROCEDURE — 81003 URINALYSIS AUTO W/O SCOPE: CPT | Performed by: STUDENT IN AN ORGANIZED HEALTH CARE EDUCATION/TRAINING PROGRAM

## 2025-01-04 PROCEDURE — 85025 COMPLETE CBC W/AUTO DIFF WBC: CPT | Performed by: STUDENT IN AN ORGANIZED HEALTH CARE EDUCATION/TRAINING PROGRAM

## 2025-01-04 PROCEDURE — 82077 ASSAY SPEC XCP UR&BREATH IA: CPT | Performed by: STUDENT IN AN ORGANIZED HEALTH CARE EDUCATION/TRAINING PROGRAM

## 2025-01-04 PROCEDURE — 80053 COMPREHEN METABOLIC PANEL: CPT | Performed by: STUDENT IN AN ORGANIZED HEALTH CARE EDUCATION/TRAINING PROGRAM

## 2025-01-04 NOTE — ED PROVIDER NOTES
Encounter Date: 1/4/2025       History     Chief Complaint   Patient presents with    Mental Health Problem     Pt told his wife that he was going to throw himself in front of a car, says he would not do that, admits to ETOH and marijuana     HPI    69-year-old male with a past medical history of COPD, depression, hypertension presents to the emergency department for evaluation of possible suicidal ideation and plan.  Reportedly patient told his wife that he is going to throw himself in front of a car.  Patient states that he would not do that at this time.  He denies suicide ideation at this time.  He denies homicidal ideation, AVH.  He denies chest pain, abdominal pain, nausea, vomiting, fever, chills, headache, dysuria, hematuria.  Notes using marijuana and alcohol but denies alcohol use today states he has been cutting back over the last few months.  No other mitigating or exacerbating factors.  Review of patient's allergies indicates:  No Known Allergies  Past Medical History:   Diagnosis Date    Arthritis     COPD (chronic obstructive pulmonary disease)     Hypertension     Prostate disorder     Thyroid disease      Past Surgical History:   Procedure Laterality Date    HAND SURGERY      left hand     Family History   Problem Relation Name Age of Onset    Cancer Mother      Cancer Father      Heart disease Father      Cancer Sister       Social History     Tobacco Use    Smoking status: Every Day     Current packs/day: 1.50     Types: Cigarettes    Smokeless tobacco: Never   Substance Use Topics    Alcohol use: Yes     Alcohol/week: 4.0 standard drinks of alcohol     Types: 4 Cans of beer per week     Comment: 8-10 beers a day    Drug use: Yes     Frequency: 7.0 times per week     Types: Marijuana, Methamphetamines     Comment: lasy use 3 months ago     Review of Systems  See HPI  Physical Exam     Initial Vitals [01/04/25 0350]   BP Pulse Resp Temp SpO2   110/71 84 16 97.7 °F (36.5 °C) 98 %      MAP       --          Physical Exam    Nursing note and vitals reviewed.  Constitutional: He appears well-developed and well-nourished. He is not diaphoretic. No distress.   HENT:   Head: Normocephalic and atraumatic.   Right Ear: External ear normal.   Left Ear: External ear normal.   Nose: Nose normal.   Eyes: Conjunctivae are normal. No scleral icterus.   Neck: Neck supple. No tracheal deviation present.   Cardiovascular:  Normal rate, regular rhythm, normal heart sounds and intact distal pulses.     Exam reveals no gallop and no friction rub.       No murmur heard.  Pulmonary/Chest: Breath sounds normal. No respiratory distress.   Abdominal: Abdomen is soft. Bowel sounds are normal. There is no abdominal tenderness.   Musculoskeletal:      Cervical back: Neck supple.     Neurological: He is alert and oriented to person, place, and time. GCS score is 15.   Skin: Skin is warm and dry.   Psychiatric: He has a normal mood and affect. His speech is normal and behavior is normal. Thought content is not paranoid and not delusional. Cognition and memory are normal. He expresses impulsivity and inappropriate judgment. He expresses no homicidal and no suicidal ideation. He expresses no suicidal plans and no homicidal plans.         ED Course   Procedures  Labs Reviewed   TSH - Abnormal       Result Value    TSH 6.551 (*)    DRUG SCREEN PANEL, URINE EMERGENCY - Abnormal    Benzodiazepines Presumptive Positive (*)     Methadone metabolites Negative      Cocaine (Metab.) Negative      Opiate Scrn, Ur Negative      Barbiturate Screen, Ur Negative      Amphetamine Screen, Ur Presumptive Positive (*)     THC Presumptive Positive (*)     Phencyclidine Negative      Creatinine, Urine 94.4      Toxicology Information SEE COMMENT      Narrative:     Specimen Source->Urine   ACETAMINOPHEN LEVEL - Abnormal    Acetaminophen (Tylenol), Serum <3.0 (*)    CBC W/ AUTO DIFFERENTIAL    WBC 9.72      RBC 4.90      Hemoglobin 14.5      Hematocrit 43.9       MCV 90      MCH 29.6      MCHC 33.0      RDW 12.6      Platelets 243      MPV 11.4      Immature Granulocytes 0.3      Gran # (ANC) 5.4      Immature Grans (Abs) 0.03      Lymph # 3.1      Mono # 1.0      Eos # 0.2      Baso # 0.02      nRBC 0      Gran % 55.7      Lymph % 32.0      Mono % 9.9      Eosinophil % 1.9      Basophil % 0.2      Differential Method Automated     COMPREHENSIVE METABOLIC PANEL    Sodium 139      Potassium 4.6      Chloride 103      CO2 24      Glucose 100      BUN 9      Creatinine 0.9      Calcium 9.4      Total Protein 7.7      Albumin 4.4      Total Bilirubin 0.4      Alkaline Phosphatase 92      AST 16      ALT 14      eGFR >60      Anion Gap 12     URINALYSIS, REFLEX TO URINE CULTURE    Specimen UA Urine, Clean Catch      Color, UA Yellow      Appearance, UA Clear      pH, UA 6.0      Specific Gravity, UA 1.010      Protein, UA Negative      Glucose, UA Negative      Ketones, UA Negative      Bilirubin (UA) Negative      Occult Blood UA Negative      Nitrite, UA Negative      Urobilinogen, UA Negative      Leukocytes, UA Negative      Narrative:     Specimen Source->Urine   ALCOHOL,MEDICAL (ETHANOL)    Alcohol, Serum <10     T4, FREE    Free T4 0.99            Imaging Results    None          Medications - No data to display  Medical Decision Making  Amount and/or Complexity of Data Reviewed  Labs: ordered. Decision-making details documented in ED Course.    Given the above, this patient presents emergency room with suicidal ideation.  The patient is tox screen is positive for methamphetamines.  He has an elevated TSH but a normal free T4.  Hyperthyroidism is not an issue here.  The patient is also positive for benzodiazepines, likely given in the emergency room.  He is positive for marijuana.  The patient is without other injuries or problems.  The patient was seen by Psychiatry and admission was recommended.    This patient is medically clear for psychiatric admission.           ED  Course as of 01/04/25 1147   Sat Jan 04, 2025   0528 CIWA score 0 [CC]   0528 Doubt severe alcohol withdrawal. [CC]   0530 Free T4: 0.99 [CC]   0600 Differential Diagnosis includes, but is not limited to:  Decompensated psychiatric disease (schizophrenia, bipolar disorder, major depression), excited delirium, medication noncompliance, substance abuse/withdrawal, intentional drug overdose, medication toxicity, APAP/ASA overdose, acute stress reaction, personality disorder, malingering, metabolic derangement   [CC]      ED Course User Index  [CC] Lan Rene MD       Medically cleared for psychiatry placement: 1/4/2025 11:47 AM                   Clinical Impression:  Final diagnoses:  [R45.851] Suicidal ideation (Primary)  [F15.929] Methamphetamine intoxication          ED Disposition Condition    Transfer to Psych Facility Stable          ED Prescriptions    None       Follow-up Information    None          Keith Chamberlain MD  01/04/25 1150

## 2025-01-04 NOTE — ED TRIAGE NOTES
Patient presents to the ED for mental health/psych evaluation. Patient talking to wife and had reportedly stated that he was going to throw himself in front of a car to kill himself tonight. Patient denies any SI/HI at this time. Denies any auditory/visual hallucination.     Review of patient's allergies indicates:  No Known Allergies

## 2025-01-04 NOTE — CONSULTS
"[facility]  Psychiatry  Telepsychiatry Consult Note    Please see previous notes:    Patient agreeable to consultation via telepsychiatry.    Tele-Consultation from Psychiatry started: 1/4/2025 at 8:31 AM    The chief complaint leading to psychiatric consultation is: SI  This consultation was requested by Dr. Chamberlain, the Emergency Department attending physician.  The location of the consulting psychiatrist is  [place], [place] .  The patient location is  [facility] EMERGENCY DEPARTMENT   The patient arrived at the ED at: 1/4/2025    Also present with the patient at the time of the consultation:      Patient Identification:   Adrien Mckeon is a 69 y.o. male.    Patient information was obtained from patient, past medical records, and primary team.  Patient presented involuntarily to the Emergency Department     Inpatient consult to Telemedicine - Psychiatry  Consult performed by: Lizette Lopez MD  Consult ordered by: Lan Rene MD        Teleconsult Time Documentation  Subjective:     History of Present Illness:  Adrien Mckeon is a 69  year old man with history of depression, alcohol use disorder, methamphetamine use disorder, HTN, COPD.      Per primary team:   69-year-old male with a past medical history of COPD, depression, hypertension presents to the emergency department for evaluation of possible suicidal ideation and plan. Reportedly patient told his wife that he is going to throw himself in front of a car. Patient states that he would not do that at this time. He denies suicide ideation at this time. He denies homicidal ideation, AVH. He denies chest pain, abdominal pain, nausea, vomiting, fever, chills, headache, dysuria, hematuria. Notes using marijuana and alcohol but denies alcohol use today states he has been cutting back over the last few months. No other mitigating or exacerbating factors.     Patient states "I'm not gonna lie to you, I'm an addict."    He states he does not " "remember why he is at the hospital. He denies making suicidal or homicidal statements to his wife, states he may not remember due to substance use. He reports using methampethamine, as well as alcohol consumption with the klonopin he is prescribed for depression.  He states he uses substances "too often". States he is looking for a way out of addiction, that he has been to rehab five times since he was 42 years old.  States his wife also uses substances ("the same thing" he consumes) and would like for her to go to rehab as well.  Reports he has depression, denies SI or past SA.       Psychiatric History:   Previous Psychiatric Hospitalizations: Yes   Previous Medication Trials: Yes   Previous Suicide Attempts: no     Outpatient psychiatrist (current & past): previously saw Dr. Puckett. Current psych meds are rx by primary care    Substance Abuse History:  Tobacco:Yes  Alcohol: Yes  Illicit Substances:methamphetamine  Detox/Rehab: Yes    Legal History: Past charges/incarcerations: 2 DWIs,  past charges for disturbing the peace     Family Psychiatric History: son with opioid addiction      Social History:  Developmental/Childhood:Achieved all developmental milestones timely  *Education:High School Diploma  Employment Status/Finances:Retired   Relationship Status/Sexual Orientation:   Children:  1 living, son  from heroin overdose   Housing Status:  lives with wife     history:  NO  Access to gun: YES, has 2 firearms that are loaded         Psychiatric Mental Status Exam:  Arousal: alert  Sensorium/Orientation: oriented to person, place  Behavior/Cooperation: cooperative   Speech: normal tone, normal rate, normal pitch, normal volume  Language: not tested  Mood: " depressed "   Affect: appropriate  Thought Process: circumstantial, tangential  Thought Content:   Auditory hallucinations: NO  Visual hallucinations: NO  Paranoia: NO  Delusions:  NO  Suicidal ideation: NO  Homicidal ideation: " NO  Attention/Concentration:  distracted  Memory:    Recent:  Decreased   Remote: Intact     Fund of Knowledge: Vocabulary appropriate      Insight: limited awareness of illness  Judgment: limited      Past Medical History:   Past Medical History:   Diagnosis Date    Arthritis     COPD (chronic obstructive pulmonary disease)     Hypertension     Prostate disorder     Thyroid disease       Laboratory Data:   Labs Reviewed   TSH - Abnormal       Result Value    TSH 6.551 (*)    ACETAMINOPHEN LEVEL - Abnormal    Acetaminophen (Tylenol), Serum <3.0 (*)    CBC W/ AUTO DIFFERENTIAL    WBC 9.72      RBC 4.90      Hemoglobin 14.5      Hematocrit 43.9      MCV 90      MCH 29.6      MCHC 33.0      RDW 12.6      Platelets 243      MPV 11.4      Immature Granulocytes 0.3      Gran # (ANC) 5.4      Immature Grans (Abs) 0.03      Lymph # 3.1      Mono # 1.0      Eos # 0.2      Baso # 0.02      nRBC 0      Gran % 55.7      Lymph % 32.0      Mono % 9.9      Eosinophil % 1.9      Basophil % 0.2      Differential Method Automated     COMPREHENSIVE METABOLIC PANEL    Sodium 139      Potassium 4.6      Chloride 103      CO2 24      Glucose 100      BUN 9      Creatinine 0.9      Calcium 9.4      Total Protein 7.7      Albumin 4.4      Total Bilirubin 0.4      Alkaline Phosphatase 92      AST 16      ALT 14      eGFR >60      Anion Gap 12     URINALYSIS, REFLEX TO URINE CULTURE    Specimen UA Urine, Clean Catch      Color, UA Yellow      Appearance, UA Clear      pH, UA 6.0      Specific Gravity, UA 1.010      Protein, UA Negative      Glucose, UA Negative      Ketones, UA Negative      Bilirubin (UA) Negative      Occult Blood UA Negative      Nitrite, UA Negative      Urobilinogen, UA Negative      Leukocytes, UA Negative      Narrative:     Specimen Source->Urine   ALCOHOL,MEDICAL (ETHANOL)    Alcohol, Serum <10     T4, FREE    Free T4 0.99     DRUG SCREEN PANEL, URINE EMERGENCY       Neurological History:  Seizures: No  Head trauma:  No    Allergies: reviewed  Review of patient's allergies indicates:  No Known Allergies    Medications in ER: Medications - No data to display    Medications at home: clonazepam, sertraline    No new subjective & objective note has been filed under this hospital service since the last note was generated.      Assessment - Diagnosis - Goals:     Diagnosis/Impression:   Methamphetamine use disorder, Alcohol use disorder, Depression (r/o SIMD)    Patient with significant addiction that is impairing judgement, behavior.     PEC for grave disability, inpatient admission for detox.     Rec:   PEC for grave disability, inpatient admission for detox.   CIWA for alcohol and benzo withdrawal     Plan of Care communicated to: Dr. Chamberlain    Time with patient: 34 min      More than 50% of the time was spent counseling/coordinating care    Consulting clinician was informed of the encounter and consult note.    Consultation ended: 1/4/2025 at 9:58 AM      Lizette Lopez MD   Psychiatry  [facility]

## 2025-01-04 NOTE — ED NOTES
MALCOLM Segundo) was called to notify of patient transfer to [facility] with [facility].

## 2025-02-02 ENCOUNTER — HOSPITAL ENCOUNTER (EMERGENCY)
Facility: HOSPITAL | Age: 70
Discharge: HOME | End: 2025-02-02
Attending: EMERGENCY MEDICINE
Payer: MEDICARE

## 2025-02-02 VITALS
HEIGHT: 67 IN | HEART RATE: 75 BPM | DIASTOLIC BLOOD PRESSURE: 70 MMHG | RESPIRATION RATE: 12 BRPM | BODY MASS INDEX: 29.03 KG/M2 | SYSTOLIC BLOOD PRESSURE: 110 MMHG | TEMPERATURE: 99 F | WEIGHT: 185 LBS | OXYGEN SATURATION: 100 %

## 2025-02-02 DIAGNOSIS — M25.572 BILATERAL ANKLE PAIN, UNSPECIFIED CHRONICITY: ICD-10-CM

## 2025-02-02 DIAGNOSIS — M25.562 PAIN IN BOTH KNEES, UNSPECIFIED CHRONICITY: ICD-10-CM

## 2025-02-02 DIAGNOSIS — M25.561 PAIN IN BOTH KNEES, UNSPECIFIED CHRONICITY: ICD-10-CM

## 2025-02-02 DIAGNOSIS — M25.571 BILATERAL ANKLE PAIN, UNSPECIFIED CHRONICITY: ICD-10-CM
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
## 2025-02-02 PROCEDURE — 99282 EMERGENCY DEPT VISIT SF MDM: CPT

## 2025-02-18 NOTE — ED PROVIDER NOTES
Encounter Date: 2/2/2025       History     Chief Complaint   Patient presents with    Fall     Pt reported a ground level fall with no LOC. The pt reported that he slipped and fell on the ground. Complaints of ankle pain on movement.      69-year-old male with history of COPD, hypertension, hypothyroidism states he slipped and fell injuring his bilateral ankles.  States he slipped on water at a store.  Denies hitting head.  No loss conscious.  Denies any neck or back pain.  Status any chest pain or pain shortness breath.  Denies any abdominal pain or GI symptoms denies any hip pain.  States he has some chronic knee pain.  He was able to walk after.      Review of patient's allergies indicates:  No Known Allergies  Past Medical History:   Diagnosis Date    Arthritis     COPD (chronic obstructive pulmonary disease)     History of psychiatric hospitalization     Hx of psychiatric care     Hypertension     Prostate disorder     Psychiatric problem     Therapy     Thyroid disease      Past Surgical History:   Procedure Laterality Date    HAND SURGERY      left hand     Family History   Problem Relation Name Age of Onset    Cancer Mother      Cancer Father      Heart disease Father      Cancer Sister       Social History[1]  Review of Systems    Physical Exam     Initial Vitals [02/02/25 2009]   BP Pulse Resp Temp SpO2   110/70 75 12 98.9 °F (37.2 °C) 100 %      MAP       --         Physical Exam    Nursing note and vitals reviewed.  Constitutional: He appears well-developed and well-nourished.   HENT:   Head: Atraumatic.   Eyes: EOM are normal. Pupils are equal, round, and reactive to light.   Neck: Neck supple. No JVD present.   Normal range of motion.  Cardiovascular:  Normal rate, regular rhythm, normal heart sounds and intact distal pulses.     Exam reveals no gallop and no friction rub.       No murmur heard.  Pulmonary/Chest: Breath sounds normal.   Abdominal: Abdomen is soft. Bowel sounds are normal. There is no  abdominal tenderness.   Musculoskeletal:         General: Normal range of motion.      Cervical back: Normal range of motion and neck supple.      Comments: Full Range motion without stability without bony tenderness to the feet ankles knees and hips.     Lymphadenopathy:     He has no cervical adenopathy.   Neurological: He is alert and oriented to person, place, and time. He has normal strength.   Skin: Skin is warm and dry.   Psychiatric: He has a normal mood and affect. Thought content normal.         ED Course   Procedures  Labs Reviewed - No data to display       Imaging Results    None          Medications - No data to display  Medical Decision Making  Acute or chronic pain in the knees.  Potential mild ankle sprains.  No indication for x-rays at this time.  Stable for discharge.  Follow up Orthopedics if needed.                                  Clinical Impression:  Final diagnoses:  [W19.XXXA] Fall, initial encounter (Primary)  [M25.561, M25.562] Pain in both knees, unspecified chronicity  [M25.571, M25.572] Bilateral ankle pain, unspecified chronicity          ED Disposition Condition    Discharge Stable          ED Prescriptions    None       Follow-up Information       Follow up With Specialties Details Why Contact Info    Ozzy Anne MD Orthopedic Surgery Schedule an appointment as soon as possible for a visit   [address]  [address]  [facility] [address]  [phone]               Ernst Sofia MD  02/18/25 0901         [1]   Social History  Tobacco Use    Smoking status: Every Day     Current packs/day: 0.50     Types: Cigarettes    Smokeless tobacco: Never   Substance Use Topics    Alcohol use: Not Currently     Alcohol/week: 4.0 standard drinks of alcohol     Types: 4 Cans of beer per week     Comment: 8-10 beers a day    Drug use: Yes     Frequency: 7.0 times per week     Types: Marijuana, Methamphetamines     Comment: lasy use 3 months ago        Ernst Sofia  MD  02/18/25 0932

## 2025-02-21 ENCOUNTER — HOSPITAL ENCOUNTER (EMERGENCY)
Facility: HOSPITAL | Age: 70
Discharge: HOME | End: 2025-02-21
Attending: EMERGENCY MEDICINE
Payer: MEDICARE

## 2025-02-21 VITALS
WEIGHT: 160 LBS | SYSTOLIC BLOOD PRESSURE: 125 MMHG | DIASTOLIC BLOOD PRESSURE: 77 MMHG | HEART RATE: 85 BPM | TEMPERATURE: 99 F | BODY MASS INDEX: 25.06 KG/M2 | RESPIRATION RATE: 20 BRPM | OXYGEN SATURATION: 99 %

## 2025-02-21 DIAGNOSIS — M54.2 NECK PAIN ON LEFT SIDE: ICD-10-CM

## 2025-02-21 DIAGNOSIS — G44.319 ACUTE POST-TRAUMATIC HEADACHE, NOT INTRACTABLE: Primary | ICD-10-CM

## 2025-02-21 PROBLEM — K57.30 DIVERTICULOSIS OF LARGE INTESTINE WITHOUT DIVERTICULITIS: Status: ACTIVE | Noted: 2022-08-31

## 2025-02-21 PROBLEM — Z86.0100 HISTORY OF COLONIC POLYPS: Status: ACTIVE | Noted: 2022-08-31

## 2025-02-21 PROBLEM — N52.9 ERECTILE DYSFUNCTION OF ORGANIC ORIGIN: Status: ACTIVE | Noted: 2022-08-31

## 2025-02-21 PROBLEM — F33.41 RECURRENT MAJOR DEPRESSIVE DISORDER, IN PARTIAL REMISSION: Status: ACTIVE | Noted: 2025-02-21

## 2025-02-21 PROBLEM — I11.9 HYPERTENSIVE HEART DISEASE WITHOUT HEART FAILURE: Status: ACTIVE | Noted: 2018-10-24

## 2025-02-21 PROBLEM — Z79.899 LONG-TERM CURRENT USE OF BENZODIAZEPINE: Status: ACTIVE | Noted: 2022-08-31

## 2025-02-21 PROBLEM — I51.89 GRADE I DIASTOLIC DYSFUNCTION: Status: ACTIVE | Noted: 2022-08-31

## 2025-02-21 PROBLEM — I34.81 MITRAL VALVE ANNULAR CALCIFICATION: Status: ACTIVE | Noted: 2022-08-31

## 2025-02-21 PROBLEM — K74.00 HEPATIC FIBROSIS: Status: ACTIVE | Noted: 2022-08-31

## 2025-02-21 PROBLEM — K76.0 HEPATIC STEATOSIS: Status: ACTIVE | Noted: 2022-08-31

## 2025-02-21 PROBLEM — I07.1 MILD TRICUSPID VALVE REGURGITATION: Status: ACTIVE | Noted: 2022-08-31

## 2025-02-21 PROBLEM — E66.3 OVERWEIGHT (BMI 25.0-29.9): Status: ACTIVE | Noted: 2022-08-31

## 2025-02-21 PROBLEM — M85.88 OSTEOPENIA OF LUMBAR SPINE: Status: ACTIVE | Noted: 2022-08-31

## 2025-02-21 PROBLEM — Z86.19 HISTORY OF HEPATITIS C: Status: ACTIVE | Noted: 2019-02-11

## 2025-02-21 PROBLEM — Z72.0 TOBACCO USE: Status: ACTIVE | Noted: 2022-08-31

## 2025-02-21 PROCEDURE — 25000003 PHARM REV CODE 250: Performed by: NURSE PRACTITIONER

## 2025-02-21 PROCEDURE — 63600175 PHARM REV CODE 636 W HCPCS: Mod: JZ,TB | Performed by: NURSE PRACTITIONER

## 2025-02-21 PROCEDURE — 99284 EMERGENCY DEPT VISIT MOD MDM: CPT | Mod: 25

## 2025-02-21 PROCEDURE — 96372 THER/PROPH/DIAG INJ SC/IM: CPT | Performed by: NURSE PRACTITIONER

## 2025-02-21 RX ORDER — LIDOCAINE 50 MG/G
1 PATCH TOPICAL
Status: DISCONTINUED | OUTPATIENT
Start: 2025-02-21 | End: 2025-02-21 | Stop reason: HOSPADM

## 2025-02-21 RX ORDER — METHOCARBAMOL 500 MG/1
500 TABLET, FILM COATED ORAL
Status: COMPLETED | OUTPATIENT
Start: 2025-02-21 | End: 2025-02-21

## 2025-02-21 RX ORDER — LIDOCAINE 50 MG/G
1 PATCH TOPICAL DAILY
Qty: 15 PATCH | Refills: 0 | Status: SHIPPED | OUTPATIENT
Start: 2025-02-21

## 2025-02-21 RX ORDER — KETOROLAC TROMETHAMINE 30 MG/ML
15 INJECTION, SOLUTION INTRAMUSCULAR; INTRAVENOUS
Status: COMPLETED | OUTPATIENT
Start: 2025-02-21 | End: 2025-02-21

## 2025-02-21 RX ADMIN — KETOROLAC TROMETHAMINE 15 MG: 30 INJECTION, SOLUTION INTRAMUSCULAR; INTRAVENOUS at 07:02

## 2025-02-21 RX ADMIN — LIDOCAINE 1 PATCH: 50 PATCH TOPICAL at 07:02

## 2025-02-21 RX ADMIN — METHOCARBAMOL 500 MG: 500 TABLET ORAL at 07:02

## 2025-02-22 NOTE — DISCHARGE INSTRUCTIONS
Thank you for coming to our Emergency Department today. It is important to remember that some problems or medical conditions are difficult to diagnose and may not be found during your Emergency Department visit.     Be sure to follow up with your primary care doctor and review all labs/imaging/tests that were performed during your ER visit with them. Some labs/tests may be outside of the normal range and require non-emergent follow-up and further investigation to help diagnose/exclude/prevent complications or other potentially serious medical conditions that were not addressed during your ER visit.    If you do not have a primary care doctor, you may contact the one listed on your discharge paperwork or you may also call the [facility] Appointment Desk at [phone] to schedule an appointment and establish care with one. It is important to your health that you have a primary care doctor.    Please take all medications as directed. All medications may potentially have side-effects and it is impossible to predict which medications may give you side-effects or what side-effects (if any) they will give you.. If you feel that you are having a negative effect or side-effect of any medication you should immediately stop taking them and seek medical attention. If you feel that you are having a life-threatening reaction call 911.    Return to the ER with any questions/concerns, new/concerning symptoms, worsening or failure to improve.     Do not drive, swim, climb to height, take a bath, operate heavy machinery, drink alcohol or take potentially sedating medications, sign any legal documents or make any important decisions for 24 hours if you have received any pain medications, sedatives or mood altering drugs during your ER visit or within 24 hours of taking them if they have been prescribed to you.     You can find additional resources for Dentists, hearing aids, durable medical equipment, low cost pharmacies and  other resources at https://[facility].org    BELOW THIS LINE ONLY APPLIES IF YOU HAVE A COVID TEST PENDING OR IF YOU HAVE BEEN DIAGNOSED WITH COVID:  Please access [facility] to review the results of your test. Until the results of your COVID test return, you should isolate yourself so as not to potentially spread illness to others.   If your COVID test returns positive, you should isolate yourself so as not to spread illness to others. After five full days, if you are feeling better and you have not had fever for 24 hours, you can return to your typical daily activities, but you must wear a mask around others for an additional 5 days.   If your COVID test returns negative and you are either unvaccinated or more than six months out from your two-dose vaccine and are not yet boosted, you should still quarantine for 5 full days followed by strict mask use for an additional 5 full days.   If your COVID test returns negative and you have received your 2-dose initial vaccine as well as a booster, you should continue strict mask use for 10 full days after the exposure.  For all those exposed, best practice includes a test at day 5 after the exposure. This can be a home test or a test through one of the many testing centers throughout our community.   Masking is always advised to limit the spread of COVID. Cdc.gov is an excellent site to obtain the latest up to date recommendations regarding COVID and COVID testing.     CDC Testing and Quarantine Guidelines for patients with exposure to a known-positive COVID-19 person:  A close exposure is defined as anyone who has had an exposure (masked or unmasked) to a known COVID -19 positive person within 6 feet of someone for a cumulative total of 15 minutes or more over a 24-hour period.   Vaccinated and/or if you recently had a positive covid test within 90 days do NOT need to quarantine after contact with someone who had COVID-19 unless you develop symptoms.   Fully vaccinated  people who have not had a positive test within 90 days, should get tested 3-5 days after their exposure, even if they don't have symptoms and wear a mask indoors in public for 14 days following exposure or until their test result is negative.      Unvaccinated and/or NOT had a positive test within 90 days and meet close exposure  You are required by CDC guidelines to quarantine for at least 5 days from time of exposure followed by 5 days of strict masking. It is recommended, but not required to test after 5 days, unless you develop symptoms, in which case you should test at that time.  If you get tested after 5 days and your test is positive, your 5 day period of isolation starts the day of the positive test.    If your exposure does not meet the above definition, you can return to your normal daily activities to include social distancing, wearing a mask and frequent handwashing.      Here is a link to guidance from the CDC:  https://www.cdc.gov/media/releases/2021/s1227-isolation-quarantine-guidance.html      [place] Dept Of Health Testing Sites:  https://ldh.la.gov/page/3934      [facility] website with testing locations and guidance:  [email or web address]://[email or web address].[email or web address]/[email or web address]
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
